# Patient Record
Sex: FEMALE | Race: WHITE | Employment: UNEMPLOYED | URBAN - METROPOLITAN AREA
[De-identification: names, ages, dates, MRNs, and addresses within clinical notes are randomized per-mention and may not be internally consistent; named-entity substitution may affect disease eponyms.]

---

## 2017-02-14 ENCOUNTER — APPOINTMENT (OUTPATIENT)
Dept: LAB | Facility: HOSPITAL | Age: 57
End: 2017-02-14
Attending: INTERNAL MEDICINE
Payer: COMMERCIAL

## 2017-02-14 ENCOUNTER — OFFICE VISIT (OUTPATIENT)
Dept: INTERNAL MEDICINE CLINIC | Facility: CLINIC | Age: 57
End: 2017-02-14

## 2017-02-14 VITALS
BODY MASS INDEX: 23.59 KG/M2 | HEART RATE: 62 BPM | WEIGHT: 139.88 LBS | DIASTOLIC BLOOD PRESSURE: 79 MMHG | SYSTOLIC BLOOD PRESSURE: 119 MMHG | HEIGHT: 64.5 IN

## 2017-02-14 DIAGNOSIS — Z00.00 ANNUAL PHYSICAL EXAM: ICD-10-CM

## 2017-02-14 DIAGNOSIS — Z00.00 ANNUAL PHYSICAL EXAM: Primary | ICD-10-CM

## 2017-02-14 LAB
ALBUMIN SERPL BCP-MCNC: 4 G/DL (ref 3.5–4.8)
ALBUMIN/GLOB SERPL: 1.4 {RATIO} (ref 1–2)
ALP SERPL-CCNC: 98 U/L (ref 32–100)
ALT SERPL-CCNC: 27 U/L (ref 14–54)
ANION GAP SERPL CALC-SCNC: 8 MMOL/L (ref 0–18)
AST SERPL-CCNC: 31 U/L (ref 15–41)
BILIRUB SERPL-MCNC: 1.4 MG/DL (ref 0.3–1.2)
BUN SERPL-MCNC: 9 MG/DL (ref 8–20)
BUN/CREAT SERPL: 13.2 (ref 10–20)
CALCIUM SERPL-MCNC: 9.2 MG/DL (ref 8.5–10.5)
CHLORIDE SERPL-SCNC: 99 MMOL/L (ref 95–110)
CHOLEST SERPL-MCNC: 143 MG/DL (ref 110–200)
CO2 SERPL-SCNC: 30 MMOL/L (ref 22–32)
CREAT SERPL-MCNC: 0.68 MG/DL (ref 0.5–1.5)
ERYTHROCYTE [DISTWIDTH] IN BLOOD BY AUTOMATED COUNT: 12.7 % (ref 11–15)
GLOBULIN PLAS-MCNC: 2.8 G/DL (ref 2.5–3.7)
GLUCOSE SERPL-MCNC: 104 MG/DL (ref 70–99)
HCT VFR BLD AUTO: 42.6 % (ref 35–48)
HDLC SERPL-MCNC: 55 MG/DL
HGB BLD-MCNC: 14.3 G/DL (ref 12–16)
LDLC SERPL CALC-MCNC: 80 MG/DL (ref 0–99)
MCH RBC QN AUTO: 31.1 PG (ref 27–32)
MCHC RBC AUTO-ENTMCNC: 33.5 G/DL (ref 32–37)
MCV RBC AUTO: 92.6 FL (ref 80–100)
NONHDLC SERPL-MCNC: 88 MG/DL
OSMOLALITY UR CALC.SUM OF ELEC: 283 MOSM/KG (ref 275–295)
PLATELET # BLD AUTO: 181 K/UL (ref 140–400)
PMV BLD AUTO: 9.7 FL (ref 7.4–10.3)
POTASSIUM SERPL-SCNC: 4.4 MMOL/L (ref 3.3–5.1)
PROT SERPL-MCNC: 6.8 G/DL (ref 5.9–8.4)
RBC # BLD AUTO: 4.6 M/UL (ref 3.7–5.4)
SODIUM SERPL-SCNC: 137 MMOL/L (ref 136–144)
T3FREE SERPL-MCNC: 3.35 PG/ML (ref 2.53–4.29)
T4 FREE SERPL-MCNC: 0.8 NG/DL (ref 0.58–1.64)
TRIGL SERPL-MCNC: 39 MG/DL (ref 1–149)
TSH SERPL-ACNC: 1.61 UIU/ML (ref 0.34–5.6)
WBC # BLD AUTO: 5.8 K/UL (ref 4–11)

## 2017-02-14 PROCEDURE — 99396 PREV VISIT EST AGE 40-64: CPT | Performed by: INTERNAL MEDICINE

## 2017-02-14 PROCEDURE — 85027 COMPLETE CBC AUTOMATED: CPT

## 2017-02-14 PROCEDURE — 80061 LIPID PANEL: CPT

## 2017-02-14 PROCEDURE — 84443 ASSAY THYROID STIM HORMONE: CPT

## 2017-02-14 PROCEDURE — 84481 FREE ASSAY (FT-3): CPT

## 2017-02-14 PROCEDURE — 80053 COMPREHEN METABOLIC PANEL: CPT

## 2017-02-14 PROCEDURE — 84439 ASSAY OF FREE THYROXINE: CPT

## 2017-02-14 PROCEDURE — 36415 COLL VENOUS BLD VENIPUNCTURE: CPT

## 2017-02-14 NOTE — H&P
Divina Moreno is a 60year old female who presents for a complete physical exam.  HPI:   Her last physical was a few years ago. In general, she feels well. No specific issues for discussion.   She is interested in routine labs to include a full thyroid HPV   • HEADACHES      migraines   • OTHER DISEASES 7/03     ASCUS Pap   • Inguinal hernia 1969   • Thyroid disease 2007     medication   • Herpes infection      per NG: in one eye   • Lazy eye 2013     per NG: \"patient says she has always had a lazy left GENERAL: Pleasant female appearing well in no distress  /79 mmHg  Pulse 62  Ht 5' 4.5\" (1.638 m)  Wt 139 lb 14.4 oz (63.458 kg)  BMI 23.65 kg/m2  HEENT: Anicteric, conjunctiva pink, oropharynx normal  NECK: Supple without mass or thyromegaly or no

## 2017-02-14 NOTE — PATIENT INSTRUCTIONS
Await results of blood tests. Continue healthy diet, regular exercise and maintenance of current body weight. Continue follow-up with Dr. Nasim Emanuel and with Gynecology.

## 2017-03-07 ENCOUNTER — OFFICE VISIT (OUTPATIENT)
Dept: INTERNAL MEDICINE CLINIC | Facility: CLINIC | Age: 57
End: 2017-03-07

## 2017-03-07 VITALS
TEMPERATURE: 99 F | SYSTOLIC BLOOD PRESSURE: 108 MMHG | WEIGHT: 140 LBS | HEIGHT: 64.5 IN | BODY MASS INDEX: 23.61 KG/M2 | OXYGEN SATURATION: 97 % | DIASTOLIC BLOOD PRESSURE: 73 MMHG | HEART RATE: 72 BPM

## 2017-03-07 DIAGNOSIS — R10.13 EPIGASTRIC PAIN: Primary | ICD-10-CM

## 2017-03-07 PROBLEM — R10.9 ABDOMINAL PAIN: Status: ACTIVE | Noted: 2017-03-07

## 2017-03-07 LAB
ALBUMIN SERPL BCP-MCNC: 3.6 G/DL (ref 3.5–4.8)
ALBUMIN/GLOB SERPL: 1.4 {RATIO} (ref 1–2)
ALP SERPL-CCNC: 92 U/L (ref 32–100)
ALT SERPL-CCNC: 29 U/L (ref 14–54)
ANION GAP SERPL CALC-SCNC: 8 MMOL/L (ref 0–18)
AST SERPL-CCNC: 32 U/L (ref 15–41)
BILIRUB SERPL-MCNC: 1.3 MG/DL (ref 0.3–1.2)
BUN SERPL-MCNC: 10 MG/DL (ref 8–20)
BUN/CREAT SERPL: 15.4 (ref 10–20)
CALCIUM SERPL-MCNC: 8.8 MG/DL (ref 8.5–10.5)
CHLORIDE SERPL-SCNC: 103 MMOL/L (ref 95–110)
CO2 SERPL-SCNC: 26 MMOL/L (ref 22–32)
CREAT SERPL-MCNC: 0.65 MG/DL (ref 0.5–1.5)
GLOBULIN PLAS-MCNC: 2.5 G/DL (ref 2.5–3.7)
GLUCOSE SERPL-MCNC: 93 MG/DL (ref 70–99)
LIPASE SERPL-CCNC: 24 U/L (ref 22–51)
OSMOLALITY UR CALC.SUM OF ELEC: 283 MOSM/KG (ref 275–295)
POTASSIUM SERPL-SCNC: 3.9 MMOL/L (ref 3.3–5.1)
PROT SERPL-MCNC: 6.1 G/DL (ref 5.9–8.4)
SODIUM SERPL-SCNC: 137 MMOL/L (ref 136–144)

## 2017-03-07 PROCEDURE — 99212 OFFICE O/P EST SF 10 MIN: CPT | Performed by: INTERNAL MEDICINE

## 2017-03-07 PROCEDURE — 99213 OFFICE O/P EST LOW 20 MIN: CPT | Performed by: INTERNAL MEDICINE

## 2017-03-07 PROCEDURE — 36415 COLL VENOUS BLD VENIPUNCTURE: CPT | Performed by: INTERNAL MEDICINE

## 2017-03-07 RX ORDER — PANTOPRAZOLE SODIUM 40 MG/1
40 TABLET, DELAYED RELEASE ORAL
Qty: 30 TABLET | Refills: 0 | Status: SHIPPED | OUTPATIENT
Start: 2017-03-07 | End: 2017-07-26

## 2017-03-07 NOTE — PATIENT INSTRUCTIONS
Abdominal Pain    Abdominal pain is pain in the stomach or belly area. Everyone has this pain from time to time. In many cases it goes away on its own. But abdominal pain can sometimes be due to a serious problem, such as appendicitis.  So it’s important If you have vomiting or diarrhea, sip water or other clear fluids. When you are ready to eat solid foods again, start with small amounts of easy-to-digest, low-fat foods. These include apple sauce, toast, or crackers.    When to seek medical care  Call [phone]

## 2017-03-07 NOTE — PROGRESS NOTES
HPI:    Patient ID: Jasmyn Sullivan is a 60year old female. HPI she came in today complaining of abdominal pain that started yesterday.   According to her yesterday in the morning she ate at salad bar , afternoon she started having abdominal discomfort mo hallucinations, behavioral problems, confusion, sleep disturbance and agitation. The patient is not nervous/anxious.             Current Outpatient Prescriptions:  Pantoprazole Sodium 40 MG Oral Tab EC Take 1 tablet (40 mg total) by mouth every morning befo Family History   Problem Relation Age of Onset   • Mental Disorder Mother      depression   • Thyroid disease Mother    • Macular degeneration Mother    • Cancer Maternal Grandmother      breast   • Breast Cancer Maternal Grandmother      age -post menop heard.  Pulmonary/Chest: Effort normal and breath sounds normal. No accessory muscle usage. No respiratory distress. She has no wheezes. She has no rales. She exhibits no tenderness. Abdominal: Soft.  Bowel sounds are normal. She exhibits no shifting dull

## 2017-03-09 ENCOUNTER — TELEPHONE (OUTPATIENT)
Dept: INTERNAL MEDICINE CLINIC | Facility: CLINIC | Age: 57
End: 2017-03-09

## 2017-03-09 NOTE — TELEPHONE ENCOUNTER
Pt informed of lab results and informed per chart note to eat light and drink plenty of fluids, pt not sure if she will take the pantoprazole.  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

## 2017-06-30 ENCOUNTER — APPOINTMENT (OUTPATIENT)
Dept: CT IMAGING | Facility: HOSPITAL | Age: 57
End: 2017-06-30
Attending: EMERGENCY MEDICINE
Payer: COMMERCIAL

## 2017-06-30 ENCOUNTER — APPOINTMENT (OUTPATIENT)
Dept: GENERAL RADIOLOGY | Facility: HOSPITAL | Age: 57
End: 2017-06-30
Attending: EMERGENCY MEDICINE
Payer: COMMERCIAL

## 2017-06-30 ENCOUNTER — HOSPITAL ENCOUNTER (EMERGENCY)
Facility: HOSPITAL | Age: 57
Discharge: HOME | End: 2017-06-30
Attending: EMERGENCY MEDICINE
Payer: COMMERCIAL

## 2017-06-30 VITALS
SYSTOLIC BLOOD PRESSURE: 127 MMHG | BODY MASS INDEX: 23.05 KG/M2 | WEIGHT: 135 LBS | OXYGEN SATURATION: 100 % | TEMPERATURE: 97 F | HEIGHT: 64 IN | DIASTOLIC BLOOD PRESSURE: 74 MMHG | HEART RATE: 55 BPM | RESPIRATION RATE: 18 BRPM

## 2017-06-30 DIAGNOSIS — S09.90XA HEAD INJURY, INITIAL ENCOUNTER: Primary | ICD-10-CM

## 2017-06-30 DIAGNOSIS — S63.502A LEFT WRIST SPRAIN, INITIAL ENCOUNTER: ICD-10-CM

## 2017-06-30 PROCEDURE — 73110 X-RAY EXAM OF WRIST: CPT | Performed by: EMERGENCY MEDICINE

## 2017-06-30 PROCEDURE — 99284 EMERGENCY DEPT VISIT MOD MDM: CPT

## 2017-06-30 PROCEDURE — 70450 CT HEAD/BRAIN W/O DYE: CPT | Performed by: EMERGENCY MEDICINE

## 2017-06-30 NOTE — ED INITIAL ASSESSMENT (HPI)
Slip and fall at the 7 eleven. Pt c/o headache and left shoulder, elbow and wrist pain. Pt unknown if there was LOC.

## 2017-06-30 NOTE — ED PROVIDER NOTES
Patient Seen in: [facility] AND [facility] Emergency Department    History   Patient presents with:  Fall    Stated Complaint: fall    HPI    58-year-old female presents for fall and head injury.   Patient was at 711 getting chips and states that after picking up Levothyroxine Sodium (SYNTHROID) 50 MCG Oral Tab,  Take 50 mcg by mouth before breakfast. Indications: MON/WED/FRI   B Complex Oral Cap,  Take 1 capsule by mouth daily. Thyroid (PETRA THYROID) 15 MG Oral Tab,  Take 15 mg by mouth daily.        Family His atraumatic. Right Ear: External ear normal.   Left Ear: External ear normal.   Nose: Nose normal. No sinus tenderness or nasal deformity.    Mouth/Throat: Uvula is midline, oropharynx is clear and moist and mucous membranes are normal.   Eyes: Conjunctiva 4. GCS verbal subscore is 5. GCS motor subscore is 6. Skin: Skin is warm, dry and intact. Psychiatric: She has a normal mood and affect. Her speech is normal.   Nursing note and vitals reviewed.             ED Course   Labs Reviewed - No data to display CEREBRUM: A small parenchymal calcification is evident in the left parietal high convexity (series 2, images 28-29). This is centered at the gray-white matter junction. No acute intraparenchymal hemorrhage, edema, or cortical sulcal effacement is apparent. encounter    Disposition:  Discharge    Follow-up:  Trent Aldrich MD  [address] [place]  [phone]    Schedule an appointment as soon as possible for a visit in 2 days  As needed      Medications Prescribed:  Current Discharge M

## 2017-07-26 ENCOUNTER — OFFICE VISIT (OUTPATIENT)
Dept: INTERNAL MEDICINE CLINIC | Facility: CLINIC | Age: 57
End: 2017-07-26

## 2017-07-26 VITALS
SYSTOLIC BLOOD PRESSURE: 126 MMHG | DIASTOLIC BLOOD PRESSURE: 77 MMHG | WEIGHT: 146 LBS | HEART RATE: 56 BPM | BODY MASS INDEX: 24.62 KG/M2 | HEIGHT: 64.5 IN

## 2017-07-26 DIAGNOSIS — S16.1XXA NECK MUSCLE STRAIN, INITIAL ENCOUNTER: ICD-10-CM

## 2017-07-26 DIAGNOSIS — M25.532 WRIST PAIN, LEFT: Primary | ICD-10-CM

## 2017-07-26 DIAGNOSIS — M25.512 ACUTE PAIN OF LEFT SHOULDER: ICD-10-CM

## 2017-07-26 PROCEDURE — 99212 OFFICE O/P EST SF 10 MIN: CPT | Performed by: INTERNAL MEDICINE

## 2017-07-26 PROCEDURE — 99213 OFFICE O/P EST LOW 20 MIN: CPT | Performed by: INTERNAL MEDICINE

## 2017-07-26 RX ORDER — CYCLOBENZAPRINE HCL 10 MG
10 TABLET ORAL 3 TIMES DAILY
Qty: 30 TABLET | Refills: 1 | Status: SHIPPED | OUTPATIENT
Start: 2017-07-26 | End: 2017-08-15

## 2017-07-26 RX ORDER — MAGNESIUM CITRATE
POWDER (GRAM) MISCELLANEOUS
COMMUNITY

## 2017-07-26 RX ORDER — GARLIC EXTRACT 500 MG
1 CAPSULE ORAL DAILY
COMMUNITY
End: 2019-06-24 | Stop reason: ALTCHOICE

## 2017-07-26 NOTE — PROGRESS NOTES
Jasmyn Sullivan is a 60year old female.   Patient presents with:  Neck Pain: fell on 6/30  Shoulder Pain  Wrist Pain      HPI:   Pt is a 59 yo woman comes with  C/o neck shoulder and wrist pain since June 30th   C/c neck pain, wrist pain, shoulder pain–on th apply route. Disp:  Rfl:    Acidophilus/Pectin Oral Cap Take 1 capsule by mouth daily. Disp:  Rfl:    Cyclobenzaprine HCl 10 MG Oral Tab Take 1 tablet (10 mg total) by mouth 3 (three) times daily.  Disp: 30 tablet Rfl: 1      Past Medical History:   Diagnos abdominal pain and denies heartburn, nausea or vomiting  : No Pain on urination, change in the color of urine, discharge, urinating frequently  MUS: No back pain, joint pain, muscle pain  NEURO: denies headaches , anxiety, depression    EXAM:   /77 shoulder area as well    The patient indicates understanding of these issues and agrees to the plan. Return in about 4 weeks (around 8/23/2017).

## 2017-07-26 NOTE — PATIENT INSTRUCTIONS
Understanding Cervical Strain    There are 7 bones (vertebrae) in the neck that are part of the spine. These are called the cervical spine. Cervical strain is a medical term for neck pain. The neck has several layers of muscles.  These are connected with · Numbness, tingling, weakness or shooting pains into the arms or legs  · New symptoms  Date Last Reviewed: 3/10/2016  © 2165-1562 [address], [address] [place] [address]. All rights reserved.  This information is not intended a

## 2018-01-16 ENCOUNTER — OFFICE VISIT (OUTPATIENT)
Dept: INTERNAL MEDICINE CLINIC | Facility: CLINIC | Age: 58
End: 2018-01-16

## 2018-01-16 VITALS
DIASTOLIC BLOOD PRESSURE: 69 MMHG | WEIGHT: 142 LBS | SYSTOLIC BLOOD PRESSURE: 102 MMHG | HEIGHT: 64.5 IN | BODY MASS INDEX: 23.95 KG/M2 | HEART RATE: 60 BPM

## 2018-01-16 DIAGNOSIS — M54.2 NECK PAIN: ICD-10-CM

## 2018-01-16 DIAGNOSIS — G89.29 CHRONIC LEFT SHOULDER PAIN: Primary | ICD-10-CM

## 2018-01-16 DIAGNOSIS — M25.512 CHRONIC LEFT SHOULDER PAIN: Primary | ICD-10-CM

## 2018-01-16 PROCEDURE — 99213 OFFICE O/P EST LOW 20 MIN: CPT | Performed by: INTERNAL MEDICINE

## 2018-01-16 PROCEDURE — 99212 OFFICE O/P EST SF 10 MIN: CPT | Performed by: INTERNAL MEDICINE

## 2018-01-16 NOTE — PROGRESS NOTES
Sandy Trujillo is a 59year old female.   Patient presents with:  Shoulder Pain: seen in July       HPI:   Pt is a 60 yo woman comes with c/o shoulder pain   C/c shoulder pain   C/o shoulder pain    Had it since last time in July but didn't get the xray , ta calamine lotion Benadryl lotion and hydrocortisone ointment  Is getting a little bit better  Wondering if she can try anything else-she can try aloe vera  Did that she is very sensitive to many things including lotions and creams and medicines  She does ag fatigue  SKIN: denies any unusual skin lesions or rashes  MUS: No back pain,+ joint pain- only left shoulder ,no  muscle pain  NEURO: denies headaches , anxiety, depression    EXAM:   /69 (BP Location: Left arm, Patient Position: Sitting, Cuff Size:

## 2018-01-16 NOTE — PATIENT INSTRUCTIONS
Shoulder and Upper Back Stretch  To start, stand tall with your ears, shoulders, and hips in line. Your feet should be slightly apart, positioned just under your hips. Focus your eyes directly in front of you.   this position for a few seconds bef

## 2018-01-18 ENCOUNTER — TELEPHONE (OUTPATIENT)
Dept: INTERNAL MEDICINE CLINIC | Facility: CLINIC | Age: 58
End: 2018-01-18

## 2018-01-18 NOTE — TELEPHONE ENCOUNTER
WOULD LIKE TO CLARIFY SOMETHING ON HER HX WITH THE NURSE OR DOCTOR.   ASKING FOR CALL BACK SEEN THIS Tuesday

## 2018-01-18 NOTE — TELEPHONE ENCOUNTER
Pt returned call. States when doctor reviewed her medical history doctor asked if she had ever had a neck Xray. She was given an order for a neck Xray. Pt states she forgot that she had an Xray done by her chiropractor in July 2017.  Results were not forwar

## 2018-01-23 ENCOUNTER — TELEPHONE (OUTPATIENT)
Dept: FAMILY MEDICINE CLINIC | Facility: CLINIC | Age: 58
End: 2018-01-23

## 2018-01-25 ENCOUNTER — HOSPITAL ENCOUNTER (OUTPATIENT)
Dept: GENERAL RADIOLOGY | Facility: HOSPITAL | Age: 58
Discharge: HOME | End: 2018-01-25
Attending: INTERNAL MEDICINE
Payer: COMMERCIAL

## 2018-01-25 DIAGNOSIS — G89.29 CHRONIC LEFT SHOULDER PAIN: ICD-10-CM

## 2018-01-25 DIAGNOSIS — M25.512 CHRONIC LEFT SHOULDER PAIN: ICD-10-CM

## 2018-01-25 PROCEDURE — 73030 X-RAY EXAM OF SHOULDER: CPT | Performed by: INTERNAL MEDICINE

## 2018-02-27 ENCOUNTER — OFFICE VISIT (OUTPATIENT)
Dept: PHYSICAL THERAPY | Facility: HOSPITAL | Age: 58
End: 2018-02-27
Attending: INTERNAL MEDICINE
Payer: COMMERCIAL

## 2018-02-27 DIAGNOSIS — G89.29 CHRONIC LEFT SHOULDER PAIN: ICD-10-CM

## 2018-02-27 DIAGNOSIS — M25.512 CHRONIC LEFT SHOULDER PAIN: ICD-10-CM

## 2018-02-27 PROCEDURE — 97161 PT EVAL LOW COMPLEX 20 MIN: CPT

## 2018-02-27 NOTE — PROGRESS NOTES
UPPER EXTREMITY EVALUATION:   Referring Physician: Dr. Kaley Alvarado  Date of Onset: June 2017 Date of Service: 2/27/2018   Diagnosis: L shoulder pain    PATIENT SUMMARY:   Carlos President is a 59year old y/o female who presents to therapy today with complaint Extension, Rotation (B) all WNL and pain-free.  SB R and L minimally restricted but pain-free  Repeated retraction: No change in sx's  Palpation: TTP anterior L shoulder within bicipital groove, L proximal triceps, L subscapularis   Sensation: denies numbne Potential: good    Patient was advised of these findings, precautions, and treatment options and has agreed to actively participate in planning and for this course of care. Thank you for your referral. Please co-sign and return this letter.   Please cont

## 2018-03-01 ENCOUNTER — OFFICE VISIT (OUTPATIENT)
Dept: PHYSICAL THERAPY | Facility: HOSPITAL | Age: 58
End: 2018-03-01
Attending: INTERNAL MEDICINE
Payer: COMMERCIAL

## 2018-03-01 DIAGNOSIS — M25.512 CHRONIC LEFT SHOULDER PAIN: ICD-10-CM

## 2018-03-01 DIAGNOSIS — G89.29 CHRONIC LEFT SHOULDER PAIN: ICD-10-CM

## 2018-03-01 PROCEDURE — 97110 THERAPEUTIC EXERCISES: CPT

## 2018-03-01 NOTE — PROGRESS NOTES
Diagnosis:L shoulder pain  Authorized # of Visits:  2/         Next MD visit: none scheduled  Fall Risk: standard         Precautions: n/a           Medication Changes since last visit?: No  Subjective: Pt states that she feels more weakness and wants to b

## 2018-03-06 ENCOUNTER — OFFICE VISIT (OUTPATIENT)
Dept: PHYSICAL THERAPY | Facility: HOSPITAL | Age: 58
End: 2018-03-06
Attending: INTERNAL MEDICINE
Payer: COMMERCIAL

## 2018-03-06 DIAGNOSIS — M25.512 CHRONIC LEFT SHOULDER PAIN: ICD-10-CM

## 2018-03-06 DIAGNOSIS — G89.29 CHRONIC LEFT SHOULDER PAIN: ICD-10-CM

## 2018-03-06 PROCEDURE — 97110 THERAPEUTIC EXERCISES: CPT

## 2018-03-06 NOTE — PROGRESS NOTES
Diagnosis:L shoulder pain  Authorized # of Visits:  3/8         Next MD visit: none scheduled  Fall Risk: standard         Precautions: n/a           Medication Changes since last visit?: No  Subjective: Pt states that she was sore after therapy but was ab

## 2018-03-08 ENCOUNTER — OFFICE VISIT (OUTPATIENT)
Dept: PHYSICAL THERAPY | Facility: HOSPITAL | Age: 58
End: 2018-03-08
Attending: INTERNAL MEDICINE
Payer: COMMERCIAL

## 2018-03-08 DIAGNOSIS — G89.29 CHRONIC LEFT SHOULDER PAIN: ICD-10-CM

## 2018-03-08 DIAGNOSIS — M25.512 CHRONIC LEFT SHOULDER PAIN: ICD-10-CM

## 2018-03-08 PROCEDURE — 97110 THERAPEUTIC EXERCISES: CPT

## 2018-03-08 NOTE — PROGRESS NOTES
Diagnosis:L shoulder pain  Authorized # of Visits:  4/8         Next MD visit: none scheduled  Fall Risk: standard         Precautions: n/a           Medication Changes since last visit?: No  Subjective: No current complaints of pain.      Objective:      T

## 2018-03-13 ENCOUNTER — OFFICE VISIT (OUTPATIENT)
Dept: PHYSICAL THERAPY | Facility: HOSPITAL | Age: 58
End: 2018-03-13
Attending: INTERNAL MEDICINE
Payer: COMMERCIAL

## 2018-03-13 DIAGNOSIS — G89.29 CHRONIC LEFT SHOULDER PAIN: ICD-10-CM

## 2018-03-13 DIAGNOSIS — M25.512 CHRONIC LEFT SHOULDER PAIN: ICD-10-CM

## 2018-03-13 PROCEDURE — 97110 THERAPEUTIC EXERCISES: CPT

## 2018-03-13 NOTE — PROGRESS NOTES
Diagnosis:L shoulder pain  Authorized # of Visits:  5/8         Next MD visit: none scheduled  Fall Risk: standard         Precautions: n/a           Medication Changes since last visit?: No  Subjective: pt states that she has a rolled towel to use as a johanna

## 2018-03-15 ENCOUNTER — OFFICE VISIT (OUTPATIENT)
Dept: PHYSICAL THERAPY | Facility: HOSPITAL | Age: 58
End: 2018-03-15
Attending: INTERNAL MEDICINE
Payer: COMMERCIAL

## 2018-03-15 DIAGNOSIS — G89.29 CHRONIC LEFT SHOULDER PAIN: ICD-10-CM

## 2018-03-15 DIAGNOSIS — M25.512 CHRONIC LEFT SHOULDER PAIN: ICD-10-CM

## 2018-03-15 PROCEDURE — 97110 THERAPEUTIC EXERCISES: CPT

## 2018-03-15 NOTE — PROGRESS NOTES
Diagnosis:L shoulder pain  Authorized # of Visits:  6/8         Next MD visit: none scheduled  Fall Risk: standard         Precautions: n/a           Medication Changes since last visit?: No  Subjective: pt states that her shoulder is fine but has some nec

## 2018-03-20 ENCOUNTER — APPOINTMENT (OUTPATIENT)
Dept: PHYSICAL THERAPY | Facility: HOSPITAL | Age: 58
End: 2018-03-20
Attending: INTERNAL MEDICINE
Payer: COMMERCIAL

## 2018-03-22 ENCOUNTER — OFFICE VISIT (OUTPATIENT)
Dept: PHYSICAL THERAPY | Facility: HOSPITAL | Age: 58
End: 2018-03-22
Attending: INTERNAL MEDICINE
Payer: COMMERCIAL

## 2018-03-22 DIAGNOSIS — M25.512 CHRONIC LEFT SHOULDER PAIN: ICD-10-CM

## 2018-03-22 DIAGNOSIS — G89.29 CHRONIC LEFT SHOULDER PAIN: ICD-10-CM

## 2018-03-22 PROCEDURE — 97110 THERAPEUTIC EXERCISES: CPT

## 2018-03-22 PROCEDURE — 97530 THERAPEUTIC ACTIVITIES: CPT

## 2018-03-22 NOTE — PROGRESS NOTES
Patient Name: Shelley Ontiveros, : 1960, MRN: [id number]   Date:  3/22/2018  Referring Physician:  Nohemi Bunn    Diagnosis: L shoulder pain    Discharge Summary    Pt has attended 7, cancelled 1, and no shown 0 visits in Physical Therapy.      Prog findings, precautions, and treatment options and has agreed to actively participate in planning and for this course of care. Thank you for your referral. If you have any questions, please contact me at Dept: [phone].     Sincerely,  Lalo Whitten

## 2018-03-26 ENCOUNTER — OFFICE VISIT (OUTPATIENT)
Dept: INTERNAL MEDICINE CLINIC | Facility: CLINIC | Age: 58
End: 2018-03-26

## 2018-03-26 VITALS
DIASTOLIC BLOOD PRESSURE: 72 MMHG | BODY MASS INDEX: 23.61 KG/M2 | HEART RATE: 59 BPM | SYSTOLIC BLOOD PRESSURE: 110 MMHG | HEIGHT: 64.5 IN | WEIGHT: 140 LBS

## 2018-03-26 DIAGNOSIS — K13.0 DRY LIPS: ICD-10-CM

## 2018-03-26 DIAGNOSIS — K52.9 GASTROENTERITIS: ICD-10-CM

## 2018-03-26 DIAGNOSIS — G89.29 CHRONIC LEFT SHOULDER PAIN: Primary | ICD-10-CM

## 2018-03-26 DIAGNOSIS — M25.512 CHRONIC LEFT SHOULDER PAIN: Primary | ICD-10-CM

## 2018-03-26 PROCEDURE — 99212 OFFICE O/P EST SF 10 MIN: CPT | Performed by: INTERNAL MEDICINE

## 2018-03-26 PROCEDURE — 99213 OFFICE O/P EST LOW 20 MIN: CPT | Performed by: INTERNAL MEDICINE

## 2018-03-26 NOTE — PROGRESS NOTES
Alejandra Peoples is a 59year old female. Patient presents with:   Follow - Up: shoulder pain finished PT       HPI:   Pt comes as a f/u   c/o shoulder pain   c/o left shoulder pain - finished physical therapy --   flossing the nerve and that's helping - cont hydrocortisone ointment  Is getting a little bit better  Wondering if she can try anything else-she can try aloe vera  Did that she is very sensitive to many things including lotions and creams and medicines  She does agree that it is getting better      C lesions or rashes  RESPIRATORY: denies shortness of breath, cough, wheezing  GI: denies abdominal pain and denies heartburn, nausea or vomiting, bloating has now resolved   : No Pain on urination, change in the color of urine, discharge, urinating freque

## 2019-03-19 ENCOUNTER — APPOINTMENT (OUTPATIENT)
Dept: CT IMAGING | Facility: HOSPITAL | Age: 59
End: 2019-03-19
Attending: EMERGENCY MEDICINE
Payer: COMMERCIAL

## 2019-03-19 ENCOUNTER — HOSPITAL ENCOUNTER (EMERGENCY)
Facility: HOSPITAL | Age: 59
Discharge: HOME | End: 2019-03-19
Attending: EMERGENCY MEDICINE
Payer: COMMERCIAL

## 2019-03-19 ENCOUNTER — HOSPITAL ENCOUNTER (OUTPATIENT)
Age: 59
Discharge: ANOTHER HOSPITAL | End: 2019-03-19
Attending: EMERGENCY MEDICINE
Payer: COMMERCIAL

## 2019-03-19 VITALS
SYSTOLIC BLOOD PRESSURE: 129 MMHG | RESPIRATION RATE: 18 BRPM | WEIGHT: 150 LBS | TEMPERATURE: 98 F | HEIGHT: 64 IN | BODY MASS INDEX: 25.61 KG/M2 | DIASTOLIC BLOOD PRESSURE: 78 MMHG | OXYGEN SATURATION: 99 % | HEART RATE: 56 BPM

## 2019-03-19 VITALS
TEMPERATURE: 97 F | DIASTOLIC BLOOD PRESSURE: 68 MMHG | SYSTOLIC BLOOD PRESSURE: 158 MMHG | HEART RATE: 50 BPM | RESPIRATION RATE: 18 BRPM | BODY MASS INDEX: 25.61 KG/M2 | HEIGHT: 64 IN | WEIGHT: 150 LBS | OXYGEN SATURATION: 100 %

## 2019-03-19 DIAGNOSIS — M54.50 ACUTE LEFT-SIDED LOW BACK PAIN WITHOUT SCIATICA: Primary | ICD-10-CM

## 2019-03-19 DIAGNOSIS — R31.9 HEMATURIA, UNSPECIFIED TYPE: ICD-10-CM

## 2019-03-19 LAB
ANION GAP SERPL CALC-SCNC: 5 MMOL/L (ref 0–18)
BACTERIA UR QL AUTO: NEGATIVE /HPF
BASOPHILS # BLD AUTO: 0.07 X10(3) UL (ref 0–0.2)
BASOPHILS NFR BLD AUTO: 0.9 %
BILIRUB UR QL STRIP: NEGATIVE
BILIRUB UR QL: NEGATIVE
BUN BLD-MCNC: 8 MG/DL (ref 7–18)
BUN/CREAT SERPL: 13.3 (ref 10–20)
CALCIUM BLD-MCNC: 9.2 MG/DL (ref 8.5–10.1)
CHLORIDE SERPL-SCNC: 101 MMOL/L (ref 98–107)
CLARITY UR: CLEAR
CLARITY UR: CLEAR
CO2 SERPL-SCNC: 29 MMOL/L (ref 21–32)
COLOR UR: YELLOW
COLOR UR: YELLOW
CREAT BLD-MCNC: 0.6 MG/DL (ref 0.55–1.02)
DEPRECATED RDW RBC AUTO: 42 FL (ref 35.1–46.3)
EOSINOPHIL # BLD AUTO: 0.09 X10(3) UL (ref 0–0.7)
EOSINOPHIL NFR BLD AUTO: 1.1 %
ERYTHROCYTE [DISTWIDTH] IN BLOOD BY AUTOMATED COUNT: 12.2 % (ref 11–15)
GLUCOSE BLD-MCNC: 95 MG/DL (ref 70–99)
GLUCOSE UR STRIP-MCNC: NEGATIVE MG/DL
GLUCOSE UR-MCNC: NEGATIVE MG/DL
HCT VFR BLD AUTO: 43.5 % (ref 35–48)
HGB BLD-MCNC: 14.4 G/DL (ref 12–16)
HGB UR QL STRIP.AUTO: NEGATIVE
IMM GRANULOCYTES # BLD AUTO: 0.01 X10(3) UL (ref 0–1)
IMM GRANULOCYTES NFR BLD: 0.1 %
LEUKOCYTE ESTERASE UR QL STRIP.AUTO: NEGATIVE
LEUKOCYTE ESTERASE UR QL STRIP: NEGATIVE
LYMPHOCYTES # BLD AUTO: 2.66 X10(3) UL (ref 1–4)
LYMPHOCYTES NFR BLD AUTO: 32.9 %
MCH RBC QN AUTO: 31.2 PG (ref 26–34)
MCHC RBC AUTO-ENTMCNC: 33.1 G/DL (ref 31–37)
MCV RBC AUTO: 94.2 FL (ref 80–100)
MONOCYTES # BLD AUTO: 0.61 X10(3) UL (ref 0.1–1)
MONOCYTES NFR BLD AUTO: 7.5 %
NEUTROPHILS # BLD AUTO: 4.64 X10 (3) UL (ref 1.5–7.7)
NEUTROPHILS # BLD AUTO: 4.64 X10(3) UL (ref 1.5–7.7)
NEUTROPHILS NFR BLD AUTO: 57.5 %
NITRITE UR QL STRIP.AUTO: NEGATIVE
NITRITE UR QL STRIP: NEGATIVE
OSMOLALITY SERPL CALC.SUM OF ELEC: 278 MOSM/KG (ref 275–295)
PH UR STRIP: 7.5 [PH]
PH UR: 8 [PH] (ref 5–8)
PLATELET # BLD AUTO: 204 10(3)UL (ref 150–450)
POTASSIUM SERPL-SCNC: 3.9 MMOL/L (ref 3.5–5.1)
PROT UR STRIP-MCNC: NEGATIVE MG/DL
PROT UR-MCNC: NEGATIVE MG/DL
RBC # BLD AUTO: 4.62 X10(6)UL (ref 3.8–5.3)
RBC #/AREA URNS AUTO: 3 /HPF
SODIUM SERPL-SCNC: 135 MMOL/L (ref 136–145)
SP GR UR STRIP: 1.01
SP GR UR STRIP: 1.01 (ref 1–1.03)
UROBILINOGEN UR STRIP-ACNC: <2
UROBILINOGEN UR STRIP-ACNC: <2 MG/DL
VIT C UR-MCNC: 20 MG/DL
WBC # BLD AUTO: 8.1 X10(3) UL (ref 4–11)
WBC #/AREA URNS AUTO: 1 /HPF

## 2019-03-19 PROCEDURE — 80048 BASIC METABOLIC PNL TOTAL CA: CPT | Performed by: EMERGENCY MEDICINE

## 2019-03-19 PROCEDURE — 85025 COMPLETE CBC W/AUTO DIFF WBC: CPT | Performed by: EMERGENCY MEDICINE

## 2019-03-19 PROCEDURE — 74176 CT ABD & PELVIS W/O CONTRAST: CPT | Performed by: EMERGENCY MEDICINE

## 2019-03-19 PROCEDURE — 99213 OFFICE O/P EST LOW 20 MIN: CPT

## 2019-03-19 PROCEDURE — 81003 URINALYSIS AUTO W/O SCOPE: CPT | Performed by: EMERGENCY MEDICINE

## 2019-03-19 PROCEDURE — 96374 THER/PROPH/DIAG INJ IV PUSH: CPT

## 2019-03-19 PROCEDURE — 81003 URINALYSIS AUTO W/O SCOPE: CPT

## 2019-03-19 PROCEDURE — 99284 EMERGENCY DEPT VISIT MOD MDM: CPT

## 2019-03-19 RX ORDER — CYCLOBENZAPRINE HCL 10 MG
10 TABLET ORAL 3 TIMES DAILY PRN
Qty: 20 TABLET | Refills: 0 | Status: SHIPPED | OUTPATIENT
Start: 2019-03-19 | End: 2019-03-26

## 2019-03-19 RX ORDER — MORPHINE SULFATE 4 MG/ML
2 INJECTION, SOLUTION INTRAMUSCULAR; INTRAVENOUS ONCE
Status: COMPLETED | OUTPATIENT
Start: 2019-03-19 | End: 2019-03-19

## 2019-03-19 NOTE — ED PROVIDER NOTES
Patient Seen in: [address]    History   Patient presents with:  Back Pain (musculoskeletal)    Stated Complaint: mid back pain    HPI    The patient is a 49-year-old female with no significant past medical history prese • OTHER SURGICAL HISTORY      breast bx   • OTHER SURGICAL HISTORY      inguinal hernia       Family history reviewed and is not pertinent to presenting problem.     Social History    Tobacco Use      Smoking status: Former Smoker        Packs/day: 1.00 (*)     Blood, Urine Small (*)     All other components within normal limits          The patient's urine dip demonstrating small amounts of blood was discussed with the patient.   I believe the etiology of the patient's pain is most likely musculoskeletal.

## 2019-03-20 NOTE — ED NOTES
Pt to ER with c/o left mid to lower back pain for about 3 days. Pt states she has been constipated over the weekend but had normal bowel movements on Sunday and Monday with OTC stool softener. Pt denies diarrhea. Pt denies blood in stool.  Pt c/o occasional

## 2019-03-20 NOTE — ED PROVIDER NOTES
Patient Seen in: [facility] AND [facility] Emergency Department    History   Patient presents with:  Back Pain (musculoskeletal)    Stated Complaint: mid back pain    HPI    27-year-old female with history of thyroid disease presents with complaints of left-side status: Former Smoker        Packs/day: 1.00        Years: 10.00        Pack years: 10        Types: Cigarettes        Quit date: 1988        Years since quittin.6      Smokeless tobacco: Never Used    Alcohol use: No      Alcohol/week: 0.0 oz other components within normal limits   CBC WITH DIFFERENTIAL WITH PLATELET    Narrative: The following orders were created for panel order CBC WITH DIFFERENTIAL WITH PLATELET.   Procedure                               Abnormality         Status Refills: 0

## 2020-07-23 ENCOUNTER — NURSE TRIAGE (OUTPATIENT)
Dept: INTERNAL MEDICINE CLINIC | Facility: CLINIC | Age: 60
End: 2020-07-23

## 2020-07-23 ENCOUNTER — TELEMEDICINE (OUTPATIENT)
Dept: INTERNAL MEDICINE CLINIC | Facility: CLINIC | Age: 60
End: 2020-07-23

## 2020-07-23 DIAGNOSIS — Z20.822 SUSPECTED COVID-19 VIRUS INFECTION: Primary | ICD-10-CM

## 2020-07-23 PROCEDURE — 99213 OFFICE O/P EST LOW 20 MIN: CPT | Performed by: INTERNAL MEDICINE

## 2020-07-23 NOTE — TELEPHONE ENCOUNTER
Action Requested: Summary for Provider     []  Critical Lab, Recommendations Needed  [] Need Additional Advice  []   FYI    []   Need Orders  [] Need Medications Sent to Pharmacy  []  Other     SUMMARY: Patient requesting to be tested for Covid, reports la

## 2020-07-23 NOTE — PROGRESS NOTES
This is a telemedicine visit with live, interactive video and audio. Patient understands and accepts financial responsibility for any deductible, co-insurance and/or co-pays associated with this service.     SUBJECTIVE    She called in today with sore t Maternal Grandmother         breast   • Breast Cancer Maternal Grandmother         age -post menopause   • Cataracts Sister    • Cataracts Other         uncle   • Diabetes Cousin    • Glaucoma Neg       Social History    Tobacco Use      Smoking status:  Fo

## 2020-07-24 ENCOUNTER — LAB ENCOUNTER (OUTPATIENT)
Dept: LAB | Facility: HOSPITAL | Age: 60
End: 2020-07-24
Attending: INTERNAL MEDICINE
Payer: COMMERCIAL

## 2020-07-24 DIAGNOSIS — Z20.822 SUSPECTED COVID-19 VIRUS INFECTION: ICD-10-CM

## 2020-07-26 LAB — SARS-COV-2 RNA RESP QL NAA+PROBE: NOT DETECTED

## 2020-10-19 ENCOUNTER — TELEMEDICINE (OUTPATIENT)
Dept: TELEHEALTH | Age: 60
End: 2020-10-19
Payer: COMMERCIAL

## 2020-10-19 ENCOUNTER — LAB ENCOUNTER (OUTPATIENT)
Dept: LAB | Facility: HOSPITAL | Age: 60
End: 2020-10-19
Attending: NURSE PRACTITIONER
Payer: COMMERCIAL

## 2020-10-19 DIAGNOSIS — Z20.822 SUSPECTED COVID-19 VIRUS INFECTION: Primary | ICD-10-CM

## 2020-10-19 DIAGNOSIS — Z20.822 SUSPECTED COVID-19 VIRUS INFECTION: ICD-10-CM

## 2020-10-19 PROCEDURE — 99203 OFFICE O/P NEW LOW 30 MIN: CPT | Performed by: NURSE PRACTITIONER

## 2020-10-19 NOTE — PATIENT INSTRUCTIONS
Quarantine Advice: Those who have tested positive for COVID should observe a 10-day quarantine period starting the day your symptoms began.  You may return to activity and work if your respiratory symptoms have improved and you are fever free for at leas sneezes  Cover your mouth and nose with a tissue when you cough or sneeze.  Throw used tissues in a lined trash can; immediately wash your hands with soap and water for at least 20 seconds or clean your hands with an alcohol-based hand  that contai before you enter the facility. These steps will help the healthcare provider's office to keep other people in the office or waiting room from getting infected or exposed. Ask your healthcare provider to call the local or state health department.  Persons w home. Also, you should use a separate bathroom, if available. If you need to be around other people or outside of the home, wear a facemask. Avoid sharing personal items with other people in your household, like dishes, towels, and bedding.   Clean all krista

## 2021-02-05 ENCOUNTER — HOSPITAL ENCOUNTER (OUTPATIENT)
Age: 61
Discharge: HOME | End: 2021-02-05
Payer: COMMERCIAL

## 2021-02-05 VITALS
HEART RATE: 64 BPM | RESPIRATION RATE: 16 BRPM | OXYGEN SATURATION: 100 % | TEMPERATURE: 98 F | SYSTOLIC BLOOD PRESSURE: 137 MMHG | DIASTOLIC BLOOD PRESSURE: 78 MMHG

## 2021-02-05 DIAGNOSIS — Z20.822 ENCOUNTER FOR LABORATORY TESTING FOR COVID-19 VIRUS: Primary | ICD-10-CM

## 2021-02-05 DIAGNOSIS — G44.89 OTHER HEADACHE SYNDROME: ICD-10-CM

## 2021-02-05 DIAGNOSIS — R11.2 NAUSEA AND VOMITING IN ADULT: ICD-10-CM

## 2021-02-05 DIAGNOSIS — R82.90 ABNORMAL FINDING IN URINE: ICD-10-CM

## 2021-02-05 LAB
BILIRUB UR QL STRIP: NEGATIVE
CLARITY UR: CLEAR
COLOR UR: YELLOW
GLUCOSE UR STRIP-MCNC: NEGATIVE MG/DL
KETONES UR STRIP-MCNC: NEGATIVE MG/DL
LEUKOCYTE ESTERASE UR QL STRIP: NEGATIVE
NITRITE UR QL STRIP: NEGATIVE
PH UR STRIP: 7.5 [PH]
PROT UR STRIP-MCNC: NEGATIVE MG/DL
SARS-COV-2 RNA RESP QL NAA+PROBE: NOT DETECTED
SP GR UR STRIP: 1.01
UROBILINOGEN UR STRIP-ACNC: <2 MG/DL

## 2021-02-05 PROCEDURE — 81002 URINALYSIS NONAUTO W/O SCOPE: CPT | Performed by: EMERGENCY MEDICINE

## 2021-02-05 PROCEDURE — 99213 OFFICE O/P EST LOW 20 MIN: CPT | Performed by: EMERGENCY MEDICINE

## 2021-02-06 NOTE — ED INITIAL ASSESSMENT (HPI)
PATIENT IS HERE WITH HEAD PAIN AND LIGHT SENSITIVITY ALONG WITH A FEVER. SHE STATES SHE VOMITED YESTERDAY AND WAS IN BED ALL DAY TODAY.

## 2021-02-06 NOTE — ED PROVIDER NOTES
Patient Seen in: [facility]      History   Patient presents with:  Covid-19 Test    Stated Complaint: testing - symptoms    HPI/Subjective:   Nickolas Smith is a healthy 56year old  female here for Covid testing after symptoms of headache, n Use      Smoking status: Former Smoker        Packs/day: 1.00        Years: 10.00        Pack years: 10        Types: Cigarettes        Quit date: 1988        Years since quittin.5      Smokeless tobacco: Never Used    Alcohol use: No      Alcoho cervical adenopathy. Skin:     General: Skin is warm. Capillary Refill: Capillary refill takes less than 2 seconds. Findings: No lesion or rash. Neurological:      General: No focal deficit present.       Mental Status: She is alert and orient (primary encounter diagnosis)  Abnormal finding in urine  Nausea and vomiting in adult  Other headache syndrome    Disposition:  Discharge  2/5/2021  7:42 pm    Follow-up:  Gustavo Bazzi MD  [address]  [phone]

## 2021-02-08 ENCOUNTER — HOSPITAL ENCOUNTER (OUTPATIENT)
Age: 61
Discharge: HOME | End: 2021-02-08
Payer: COMMERCIAL

## 2021-02-08 VITALS
WEIGHT: 170 LBS | DIASTOLIC BLOOD PRESSURE: 72 MMHG | OXYGEN SATURATION: 100 % | TEMPERATURE: 97 F | HEART RATE: 60 BPM | BODY MASS INDEX: 29.02 KG/M2 | SYSTOLIC BLOOD PRESSURE: 129 MMHG | RESPIRATION RATE: 16 BRPM | HEIGHT: 64 IN

## 2021-02-08 DIAGNOSIS — R82.90 NONSPECIFIC FINDINGS ON EXAMINATION OF URINE: Primary | ICD-10-CM

## 2021-02-08 LAB
BILIRUB UR QL STRIP: NEGATIVE
CLARITY UR: CLEAR
COLOR UR: YELLOW
GLUCOSE UR STRIP-MCNC: NEGATIVE MG/DL
KETONES UR STRIP-MCNC: NEGATIVE MG/DL
LEUKOCYTE ESTERASE UR QL STRIP: NEGATIVE
NITRITE UR QL STRIP: NEGATIVE
PH UR STRIP: 6.5 [PH]
PROT UR STRIP-MCNC: NEGATIVE MG/DL
SP GR UR STRIP: 1.01
UROBILINOGEN UR STRIP-ACNC: <2 MG/DL

## 2021-02-08 PROCEDURE — 99213 OFFICE O/P EST LOW 20 MIN: CPT | Performed by: NURSE PRACTITIONER

## 2021-02-08 PROCEDURE — 81002 URINALYSIS NONAUTO W/O SCOPE: CPT | Performed by: NURSE PRACTITIONER

## 2021-02-09 NOTE — ED PROVIDER NOTES
Patient presents with:  Urinary Symptoms      HPI:     Gregg Goodell is a 56year old female who presents for a urine test. She was seen here 3 days ago after a viral syndrome. She had a negative COVID test and states her GI symptoms have resolved.  She sta Years since quittin.5      Smokeless tobacco: Never Used    Substance and Sexual Activity      Alcohol use: No        Alcohol/week: 0.0 standard drinks      Drug use: No      Sexual activity: Yes    Lifestyle      Physical activity        Days per wee rashes  HEENT:normocephalic, ears and throat are clear  EYES: sclera non icteric, conjunctiva non-injected  NECK: supple, no adenopathy  LUNGS: clear to auscultation, no RRW  CARDIO: RRR without murmur  EXTREMITIES: no cyanosis or edema.  CORCORAN without diffic

## 2021-04-07 ENCOUNTER — TELEPHONE (OUTPATIENT)
Dept: INTERNAL MEDICINE CLINIC | Facility: CLINIC | Age: 61
End: 2021-04-07

## 2021-04-07 DIAGNOSIS — Z12.31 BREAST CANCER SCREENING BY MAMMOGRAM: Primary | ICD-10-CM

## 2021-04-07 NOTE — TELEPHONE ENCOUNTER
Please see note below and advise. Order pended. Patient has an appointment with Dr Macy Cole on 6/7/2021.

## 2021-04-08 NOTE — TELEPHONE ENCOUNTER
Left message on voicemail that mammogram ordered and to call [phone] to get it scheduled. MyChart message sent as well.

## 2021-04-10 ENCOUNTER — HOSPITAL ENCOUNTER (OUTPATIENT)
Dept: MAMMOGRAPHY | Facility: HOSPITAL | Age: 61
Discharge: HOME | End: 2021-04-10
Attending: INTERNAL MEDICINE
Payer: COMMERCIAL

## 2021-04-10 DIAGNOSIS — Z12.31 BREAST CANCER SCREENING BY MAMMOGRAM: ICD-10-CM

## 2021-04-10 PROCEDURE — 77063 BREAST TOMOSYNTHESIS BI: CPT | Performed by: INTERNAL MEDICINE

## 2021-04-10 PROCEDURE — 77067 SCR MAMMO BI INCL CAD: CPT | Performed by: INTERNAL MEDICINE

## 2021-04-12 NOTE — TELEPHONE ENCOUNTER
[email or web address] message viewed by patient.      From   Karol Regalado RN To   Arleen Stallings and Delivered   4/8/2021 11:41 AM   Last Read in [employer]   4/8/2021 12:58 PM by Faith Mak

## 2021-04-16 ENCOUNTER — TELEPHONE (OUTPATIENT)
Dept: INTERNAL MEDICINE CLINIC | Facility: CLINIC | Age: 61
End: 2021-04-16

## 2021-04-16 NOTE — TELEPHONE ENCOUNTER
Patient calling today as she had her mammogram completed on 4/10/21 and was advised  The radiologist has requested previous mammogram films so they can compare the current one to the old studies.  This to see whether is anything is new or not.  If they are

## 2021-04-16 NOTE — TELEPHONE ENCOUNTER
Please call the mammogram dept and get a further explanation on if they need the pateint to do anything.  Typically, the Rad dept will vinicius down the prior studies

## 2021-04-16 NOTE — TELEPHONE ENCOUNTER
Per patient she had her 1st Covid 19 vaccine last 04/13/2021 and her 2nd will be on 05/04/2021 in [facility] with Jorge Mistry.

## 2021-04-16 NOTE — TELEPHONE ENCOUNTER
I sent a message to the patient via [address] He can contact her by phone and relayed the message that they have not looked at the old films and will be contacting her to schedule additional mammogram views.

## 2021-04-16 NOTE — TELEPHONE ENCOUNTER
Patient viewed [address]     The radiology department has compared the current studies to the previous studies. Francie Kapoor are recommending additional mammogram imaging. Francie Kapoor will be in contact with you to set all of this up.     Please contact the office if you

## 2021-04-16 NOTE — TELEPHONE ENCOUNTER
Spoke to mammogram department and a new addendum was resulted today. Dr. Eboni Langley please see Mesfin Jean from 4/10/21.     Study Result    Addenda   ADDENDUM:  Grouped calcifications in the anterior right breast approximately 06:00 o'clock are not clearly seen

## 2021-04-20 ENCOUNTER — OFFICE VISIT (OUTPATIENT)
Dept: INTERNAL MEDICINE CLINIC | Facility: CLINIC | Age: 61
End: 2021-04-20
Payer: COMMERCIAL

## 2021-04-20 ENCOUNTER — LAB ENCOUNTER (OUTPATIENT)
Dept: LAB | Facility: HOSPITAL | Age: 61
End: 2021-04-20
Attending: NURSE PRACTITIONER
Payer: COMMERCIAL

## 2021-04-20 VITALS
HEIGHT: 64 IN | WEIGHT: 184.5 LBS | HEART RATE: 56 BPM | SYSTOLIC BLOOD PRESSURE: 116 MMHG | DIASTOLIC BLOOD PRESSURE: 77 MMHG | BODY MASS INDEX: 31.5 KG/M2

## 2021-04-20 DIAGNOSIS — E55.9 VITAMIN D DEFICIENCY: ICD-10-CM

## 2021-04-20 DIAGNOSIS — E53.8 VITAMIN B12 DEFICIENCY: ICD-10-CM

## 2021-04-20 DIAGNOSIS — Z00.00 ANNUAL PHYSICAL EXAM: Primary | ICD-10-CM

## 2021-04-20 DIAGNOSIS — Z00.00 ANNUAL PHYSICAL EXAM: ICD-10-CM

## 2021-04-20 PROCEDURE — 80053 COMPREHEN METABOLIC PANEL: CPT | Performed by: NURSE PRACTITIONER

## 2021-04-20 PROCEDURE — 82607 VITAMIN B-12: CPT | Performed by: NURSE PRACTITIONER

## 2021-04-20 PROCEDURE — 84443 ASSAY THYROID STIM HORMONE: CPT | Performed by: NURSE PRACTITIONER

## 2021-04-20 PROCEDURE — 36415 COLL VENOUS BLD VENIPUNCTURE: CPT | Performed by: NURSE PRACTITIONER

## 2021-04-20 PROCEDURE — 99386 PREV VISIT NEW AGE 40-64: CPT | Performed by: NURSE PRACTITIONER

## 2021-04-20 PROCEDURE — 3008F BODY MASS INDEX DOCD: CPT | Performed by: NURSE PRACTITIONER

## 2021-04-20 PROCEDURE — 82306 VITAMIN D 25 HYDROXY: CPT | Performed by: NURSE PRACTITIONER

## 2021-04-20 PROCEDURE — 85025 COMPLETE CBC W/AUTO DIFF WBC: CPT | Performed by: NURSE PRACTITIONER

## 2021-04-20 PROCEDURE — 3074F SYST BP LT 130 MM HG: CPT | Performed by: NURSE PRACTITIONER

## 2021-04-20 PROCEDURE — 81015 MICROSCOPIC EXAM OF URINE: CPT

## 2021-04-20 PROCEDURE — 80061 LIPID PANEL: CPT | Performed by: NURSE PRACTITIONER

## 2021-04-20 PROCEDURE — 3078F DIAST BP <80 MM HG: CPT | Performed by: NURSE PRACTITIONER

## 2021-04-20 NOTE — ASSESSMENT & PLAN NOTE
56-year-old female who is scheduled to have a physical exam tomorrow but accidentally came in today. We were able to accommodate her. She is a very pleasant person. She has no complaints.   She recently had a mammogram and needs to go back for further im

## 2021-04-20 NOTE — PROGRESS NOTES
HPI:    Patient ID: Roby Quintana is a 56year old female. Patient is not sexually active. Dr. Elena Garrett [facility]). She sees [facility].     Immunization History  Administered            Date(s) Administered    [employer] 30 mcg/0.3 ml vegetables.       Past Medical History:   Diagnosis Date   • Autoimmune disease ([facility] [address]    • BACK PAIN    • Blepharitis 2013    OU   • Cataract 2013    OU   • CERVICAL DYSPLASIA     HPV   • HEADACHES     migraines   • Herpes infection     per NG: in one eye HENT: Negative for congestion, ear discharge, ear pain, facial swelling, hearing loss, postnasal drip, sinus pressure, sore throat and trouble swallowing. Eyes: Positive for visual disturbance (Right eye cataract).  Negative for pain, discharge and red 31.67 kg/m²   Wt Readings from Last 2 Encounters:  04/20/21 : 184 lb 8 oz (83.7 kg)  02/08/21 : 170 lb (77.1 kg)    Body mass index is 31.67 kg/m². (2)           ASSESSMENT/PLAN:     Problem List Items Addressed This Visit        Unprioritized    Annual phy B12      TSH W Reflex To Free T4      UA Microscopic only, urine [E]      Meds This Visit:  Requested Prescriptions      No prescriptions requested or ordered in this encounter       Imaging & Referrals:  None         NADEGE Lindo

## 2021-05-10 ENCOUNTER — HOSPITAL ENCOUNTER (OUTPATIENT)
Dept: MAMMOGRAPHY | Facility: HOSPITAL | Age: 61
Discharge: HOME | End: 2021-05-10
Attending: INTERNAL MEDICINE
Payer: COMMERCIAL

## 2021-05-10 DIAGNOSIS — R92.8 ABNORMAL MAMMOGRAM: ICD-10-CM

## 2021-05-10 PROCEDURE — 77065 DX MAMMO INCL CAD UNI: CPT | Performed by: INTERNAL MEDICINE

## 2021-05-10 PROCEDURE — 77061 BREAST TOMOSYNTHESIS UNI: CPT | Performed by: INTERNAL MEDICINE

## 2021-09-23 ENCOUNTER — TELEPHONE (OUTPATIENT)
Dept: INTERNAL MEDICINE CLINIC | Facility: CLINIC | Age: 61
End: 2021-09-23

## 2021-09-23 NOTE — TELEPHONE ENCOUNTER
Patient is requesting orders for physical an occupational therapy for her legs. Please call when orders are placed or any questions.

## 2021-09-23 NOTE — TELEPHONE ENCOUNTER
Pt has a h/x of plantar fasciitis and has had pain again. She has tried to go to her chiropractor for adjustments and stretching with minimal relief. She states PT has worked before in the past. Advised to call back for worsening of symptoms.  Apt made with

## 2021-09-23 NOTE — TELEPHONE ENCOUNTER
Spoke to patient and asked her why she was requesting physical therapy and occupational therapy. She states she has plantar fasciitis on her right foot since the end of June.  I advised her to schedule an appointment to be seen to address her issue and her

## 2021-09-24 ENCOUNTER — OFFICE VISIT (OUTPATIENT)
Dept: INTERNAL MEDICINE CLINIC | Facility: CLINIC | Age: 61
End: 2021-09-24
Payer: COMMERCIAL

## 2021-09-24 VITALS
BODY MASS INDEX: 31.58 KG/M2 | SYSTOLIC BLOOD PRESSURE: 124 MMHG | HEIGHT: 64 IN | WEIGHT: 185 LBS | RESPIRATION RATE: 16 BRPM | HEART RATE: 59 BPM | DIASTOLIC BLOOD PRESSURE: 81 MMHG

## 2021-09-24 DIAGNOSIS — M72.2 PLANTAR FASCIITIS OF RIGHT FOOT: Primary | ICD-10-CM

## 2021-09-24 PROCEDURE — 99213 OFFICE O/P EST LOW 20 MIN: CPT | Performed by: INTERNAL MEDICINE

## 2021-09-24 PROCEDURE — 3079F DIAST BP 80-89 MM HG: CPT | Performed by: INTERNAL MEDICINE

## 2021-09-24 PROCEDURE — 3008F BODY MASS INDEX DOCD: CPT | Performed by: INTERNAL MEDICINE

## 2021-09-24 PROCEDURE — 3074F SYST BP LT 130 MM HG: CPT | Performed by: INTERNAL MEDICINE

## 2021-09-24 NOTE — PROGRESS NOTES
Morgan Hennessy is a 59year old female. Patient presents with:  Heel Pain: right heel pain. pt stts she has plantar fascitis    HPI:   Since late June she has had persistent \"plantar fasciitis\" affecting her right heel.   She has ongoing pain on the planta that does not track\"   • MGD (meibomian gland dysfunction) 2013    OU; Schirmer's test 13mm   • OTHER DISEASES 7/03    ASCUS Pap   • PLEVA (pityriasis lichenoides et varioliformis acuta)    • PLEVA (pityriasis lichenoides et varioliformis acuta)    • Thyr

## 2021-11-03 ENCOUNTER — TELEPHONE (OUTPATIENT)
Dept: PHYSICAL THERAPY | Facility: HOSPITAL | Age: 61
End: 2021-11-03

## 2021-11-04 ENCOUNTER — OFFICE VISIT (OUTPATIENT)
Dept: PHYSICAL THERAPY | Facility: HOSPITAL | Age: 61
End: 2021-11-04
Attending: INTERNAL MEDICINE
Payer: COMMERCIAL

## 2021-11-04 DIAGNOSIS — M72.2 PLANTAR FASCIITIS OF RIGHT FOOT: ICD-10-CM

## 2021-11-04 PROCEDURE — 97162 PT EVAL MOD COMPLEX 30 MIN: CPT

## 2021-11-04 PROCEDURE — 97110 THERAPEUTIC EXERCISES: CPT

## 2021-11-04 PROCEDURE — 97140 MANUAL THERAPY 1/> REGIONS: CPT

## 2021-11-04 NOTE — PROGRESS NOTES
LOWER EXTREMITY EVALUATION:   Referring Physician: Dr. Gordon Mccoy  Diagnosis: R plantar fasciitis     Date of Service: 11/4/2021  Insurance: Deatrice Mouse CHOICE/HMO/POS/EPO     PATIENT SUMMARY   David Alvarado is a 59year old female who presents to ther lichenoides et varioliformis acuta)    • PLEVA (pityriasis lichenoides et varioliformis acuta)    • Thyroid disease 2007    medication   • Viral conjunctivitis 09/04/2013    OD     Past Surgical History:   Procedure Laterality Date   • Benign biopsy right Flexion Fingertips to dorsal foot    L LBP   Extension Limited 15% hinging L4-5 L LBP   R Sidebend WNL    L Sidebend Limited 25% L LBP   R Rotation WNL    L Rotation Limited 25% L LBP         AROM: (* denotes performed with pain)  Hip Knee Foot/Ankle   F levels.   PLAN OF CARE:    Goals: (to be met in 12 visits)  Up to 45 min walking to be able to go grocery shopping without difficulty max 3/10 pain  Pt will be able tolerate standing at least 1 hr to be able to perform chores at home and run errands with ma

## 2021-11-11 ENCOUNTER — OFFICE VISIT (OUTPATIENT)
Dept: PHYSICAL THERAPY | Facility: HOSPITAL | Age: 61
End: 2021-11-11
Attending: INTERNAL MEDICINE
Payer: COMMERCIAL

## 2021-11-11 PROCEDURE — 97110 THERAPEUTIC EXERCISES: CPT

## 2021-11-11 PROCEDURE — 97140 MANUAL THERAPY 1/> REGIONS: CPT

## 2021-11-11 NOTE — PROGRESS NOTES
Dx: R plantar fasciitis               Insurance (Authorized # of Visits):  10 (Lian Sen)  Authorizing Physician: Dr. Chente Cano   Next MD visit: none  Precautions: has PLEVA             Subjective: Had back cramps yesterday.  VAS R he Date:   Tx#: 6/   MT · Upper and mid T/S manip  · L3-5 lumbar gapping gr 3 bilat  · R TCJ, STJ, 2nd TMT, calcaneocuboid manipulations  · STM R sciatic and Tibial nerves         EX · Open books x15 bilat  · gastroc stretch R x1 min  · Seated sciatic N glide

## 2021-11-16 ENCOUNTER — OFFICE VISIT (OUTPATIENT)
Dept: PHYSICAL THERAPY | Facility: HOSPITAL | Age: 61
End: 2021-11-16
Attending: INTERNAL MEDICINE
Payer: COMMERCIAL

## 2021-11-16 PROCEDURE — 97140 MANUAL THERAPY 1/> REGIONS: CPT

## 2021-11-16 PROCEDURE — 97110 THERAPEUTIC EXERCISES: CPT

## 2021-11-16 NOTE — PROGRESS NOTES
Dx: R plantar fasciitis               Insurance (Authorized # of Visits):  10 (Ellouise Sandhoff)  Authorizing Physician: Dr. Erin Brown   Next MD visit: none  Precautions: has PLEVA             Subjective: VAS R heel 4-5/10, lower back pain- bilat  · R TCJ, STJ, 2nd TMT, calcaneocuboid manipulations  · STM R sciatic and Tibial nerves   · Upper and mid T/S manip  · L3-5 lumbar gapping gr 3 bilat  · R TCJ, STJ, 2nd TMT, calcaneocuboid manipulations  · STM R sciatic and Tibial nerves      EX · Op

## 2021-11-18 ENCOUNTER — APPOINTMENT (OUTPATIENT)
Dept: PHYSICAL THERAPY | Facility: HOSPITAL | Age: 61
End: 2021-11-18
Attending: INTERNAL MEDICINE
Payer: COMMERCIAL

## 2021-11-18 ENCOUNTER — TELEPHONE (OUTPATIENT)
Dept: PHYSICAL THERAPY | Facility: HOSPITAL | Age: 61
End: 2021-11-18

## 2021-11-23 ENCOUNTER — OFFICE VISIT (OUTPATIENT)
Dept: PHYSICAL THERAPY | Facility: HOSPITAL | Age: 61
End: 2021-11-23
Attending: INTERNAL MEDICINE
Payer: COMMERCIAL

## 2021-11-23 PROCEDURE — 97110 THERAPEUTIC EXERCISES: CPT

## 2021-11-23 PROCEDURE — 97140 MANUAL THERAPY 1/> REGIONS: CPT

## 2021-11-23 NOTE — PROGRESS NOTES
Dx: R plantar fasciitis               Insurance (Authorized # of Visits):  10 (Chris Monday)  Authorizing Physician: Dr. Buck Johnson   Next MD visit: none  Precautions: has PLEVA             Subjective: VAS R heel 5/10 today.  [facility] Date:    Tx#: 6/   MT · Upper and mid T/S manip  · L3-5 lumbar gapping gr 3 bilat  · R TCJ, STJ, 2nd TMT, calcaneocuboid manipulations  · STM R sciatic and Tibial nerves   · Upper and mid T/S manip  · L3-5 lumbar gapping gr 3 bilat  · R TCJ, STJ,

## 2021-11-26 ENCOUNTER — APPOINTMENT (OUTPATIENT)
Dept: PHYSICAL THERAPY | Facility: HOSPITAL | Age: 61
End: 2021-11-26
Attending: INTERNAL MEDICINE
Payer: COMMERCIAL

## 2021-11-30 ENCOUNTER — OFFICE VISIT (OUTPATIENT)
Dept: PHYSICAL THERAPY | Facility: HOSPITAL | Age: 61
End: 2021-11-30
Attending: INTERNAL MEDICINE
Payer: COMMERCIAL

## 2021-11-30 PROCEDURE — 97140 MANUAL THERAPY 1/> REGIONS: CPT

## 2021-11-30 PROCEDURE — 97110 THERAPEUTIC EXERCISES: CPT

## 2021-11-30 NOTE — PROGRESS NOTES
Dx: R plantar fasciitis               Insurance (Authorized # of Visits):  10 (Kimberly Aleman)  Authorizing Physician: Dr. Alexa Villalobos   Next MD visit: none  Precautions: has PLEVA             Subjective: VAS R heel 6/10 today.  Cooked for h manip  · L3-5 lumbar gapping gr 3 bilat  · R TCJ, STJ, 2nd TMT, calcaneocuboid manipulations  · STM R sciatic and Tibial nerves · Upper and mid T/S manip  · L3-5 lumbar gapping gr 3 bilat  · R TCJ, STJ, 2nd TMT, calcaneocuboid manipulations  · STM R sciati

## 2021-12-02 ENCOUNTER — APPOINTMENT (OUTPATIENT)
Dept: PHYSICAL THERAPY | Facility: HOSPITAL | Age: 61
End: 2021-12-02
Attending: INTERNAL MEDICINE
Payer: COMMERCIAL

## 2021-12-14 ENCOUNTER — OFFICE VISIT (OUTPATIENT)
Dept: PHYSICAL THERAPY | Facility: HOSPITAL | Age: 61
End: 2021-12-14
Attending: INTERNAL MEDICINE
Payer: COMMERCIAL

## 2021-12-14 PROCEDURE — 97140 MANUAL THERAPY 1/> REGIONS: CPT

## 2021-12-14 PROCEDURE — 97110 THERAPEUTIC EXERCISES: CPT

## 2021-12-14 NOTE — PROGRESS NOTES
Dx: R plantar fasciitis               Insurance (Authorized # of Visits):  10 (Pako Soriano)  Authorizing Physician: Dr. Heri Dominguez   Next MD visit: none  Precautions: has PLEVA             Subjective: VAS R heel 7-8/10.  Had back spasms 2nd TMT, calcaneocuboid manipulations  · STM R sciatic and Tibial nerves  · Long axis traction SIJ manip R · Upper and mid T/S manip  · L3-5 lumbar gapping gr 3 bilat  · R TCJ, STJ, 2nd TMT, calcaneocuboid manipulations  · STM R sciatic and Tibial nerves

## 2022-05-06 ENCOUNTER — TELEPHONE (OUTPATIENT)
Dept: INTERNAL MEDICINE CLINIC | Facility: CLINIC | Age: 62
End: 2022-05-06

## 2022-05-06 LAB — AMB EXT COVID-19 RESULT: DETECTED

## 2022-05-06 NOTE — TELEPHONE ENCOUNTER
Patient calling ( identified name and  ) took a home COVID  Test and it is positive    Has symptoms of cough, tired, chills , onset since Tuesday     Has been taking Tylenol , Ricola drops , feel better than Tuesday     Reviewed with patient to call if develops any new/worsening symptoms but ER if develops moderate-severe SOB or chest pain, and patient  voiced understanding and agrees    Advised to call back with any questions/ concerns       Reviewed the following :    10 THINGS YOU CAN DO TO MANAGE YOUR [address]    If you have possible or confirmed COVID-19    1. Stay home except to get medical care  2. Monitor your symptoms carefully. If your symptoms get worse, call your healthcare provider immediately. 3. Get rest and stay hydrated. 4. If you have a medical appointment, call the healthcare provider ahead of time and tell them you have or may have COVID-19.  5. For medical emergencies, call 911 and notify the dispatch personnel that you have or may have COVID-19.  6. Cover your cough and sneezes with a tissue or use the inside of your elbow. 7. Wash your hands often with soap and water for at least 20 seconds or clean hands with an alcohol-based hand  that contains at least 60% alcohol. 8. As much as possible, stay in a specific room and away from other people in your home. Also, you should use a separate bathroom, if available. If you need to be around other people in or outside of the home, wear a mask. 9. Avoid sharing personal items with other people in your household, like dishes, towels, and bedding. 10. Clean all surfaces that are touched often, like counters, tabletops, and doorknobs. Use household cleaning sprays or wipes according to the label instructions.     cdc.gov/coronavirus

## 2022-07-01 ENCOUNTER — OFFICE VISIT (OUTPATIENT)
Dept: INTERNAL MEDICINE CLINIC | Facility: CLINIC | Age: 62
End: 2022-07-01
Payer: COMMERCIAL

## 2022-07-01 ENCOUNTER — NURSE TRIAGE (OUTPATIENT)
Dept: INTERNAL MEDICINE CLINIC | Facility: CLINIC | Age: 62
End: 2022-07-01

## 2022-07-01 VITALS
WEIGHT: 188 LBS | DIASTOLIC BLOOD PRESSURE: 77 MMHG | HEIGHT: 65 IN | BODY MASS INDEX: 31.32 KG/M2 | SYSTOLIC BLOOD PRESSURE: 116 MMHG | HEART RATE: 64 BPM

## 2022-07-01 DIAGNOSIS — L03.116 CELLULITIS OF LEFT LOWER EXTREMITY: Primary | ICD-10-CM

## 2022-07-01 DIAGNOSIS — Z12.31 ENCOUNTER FOR SCREENING MAMMOGRAM FOR MALIGNANT NEOPLASM OF BREAST: ICD-10-CM

## 2022-07-01 PROCEDURE — 99213 OFFICE O/P EST LOW 20 MIN: CPT | Performed by: NURSE PRACTITIONER

## 2022-07-01 PROCEDURE — 3008F BODY MASS INDEX DOCD: CPT | Performed by: NURSE PRACTITIONER

## 2022-07-01 PROCEDURE — 3078F DIAST BP <80 MM HG: CPT | Performed by: NURSE PRACTITIONER

## 2022-07-01 PROCEDURE — 3074F SYST BP LT 130 MM HG: CPT | Performed by: NURSE PRACTITIONER

## 2022-07-01 RX ORDER — AMOXICILLIN AND CLAVULANATE POTASSIUM 875; 125 MG/1; MG/1
1 TABLET, FILM COATED ORAL 2 TIMES DAILY
Qty: 10 TABLET | Refills: 0 | Status: SHIPPED | OUTPATIENT
Start: 2022-07-01 | End: 2022-07-06

## 2022-07-21 ENCOUNTER — NURSE TRIAGE (OUTPATIENT)
Dept: INTERNAL MEDICINE CLINIC | Facility: CLINIC | Age: 62
End: 2022-07-21

## 2022-07-22 ENCOUNTER — OFFICE VISIT (OUTPATIENT)
Dept: INTERNAL MEDICINE CLINIC | Facility: CLINIC | Age: 62
End: 2022-07-22
Payer: COMMERCIAL

## 2022-07-22 VITALS
BODY MASS INDEX: 31.82 KG/M2 | WEIGHT: 191 LBS | HEART RATE: 73 BPM | SYSTOLIC BLOOD PRESSURE: 113 MMHG | HEIGHT: 65 IN | DIASTOLIC BLOOD PRESSURE: 69 MMHG

## 2022-07-22 DIAGNOSIS — R23.8 PAPULE OF SKIN: Primary | ICD-10-CM

## 2022-07-22 PROCEDURE — 3008F BODY MASS INDEX DOCD: CPT | Performed by: NURSE PRACTITIONER

## 2022-07-22 PROCEDURE — 3078F DIAST BP <80 MM HG: CPT | Performed by: NURSE PRACTITIONER

## 2022-07-22 PROCEDURE — 99213 OFFICE O/P EST LOW 20 MIN: CPT | Performed by: NURSE PRACTITIONER

## 2022-07-22 PROCEDURE — 3074F SYST BP LT 130 MM HG: CPT | Performed by: NURSE PRACTITIONER

## 2022-07-22 RX ORDER — LEVOTHYROXINE SODIUM 100 %
POWDER (GRAM) MISCELLANEOUS AS DIRECTED
COMMUNITY
End: 2022-07-22

## 2022-08-02 ENCOUNTER — LAB ENCOUNTER (OUTPATIENT)
Dept: LAB | Facility: HOSPITAL | Age: 62
End: 2022-08-02
Attending: NURSE PRACTITIONER
Payer: COMMERCIAL

## 2022-08-02 ENCOUNTER — OFFICE VISIT (OUTPATIENT)
Dept: INTERNAL MEDICINE CLINIC | Facility: CLINIC | Age: 62
End: 2022-08-02
Payer: COMMERCIAL

## 2022-08-02 VITALS
SYSTOLIC BLOOD PRESSURE: 127 MMHG | WEIGHT: 189.13 LBS | BODY MASS INDEX: 31.51 KG/M2 | DIASTOLIC BLOOD PRESSURE: 82 MMHG | HEART RATE: 56 BPM | HEIGHT: 65 IN

## 2022-08-02 DIAGNOSIS — Z13.820 SCREENING FOR OSTEOPOROSIS: ICD-10-CM

## 2022-08-02 DIAGNOSIS — L65.9 HAIR THINNING: ICD-10-CM

## 2022-08-02 DIAGNOSIS — Z12.11 COLON CANCER SCREENING: Primary | ICD-10-CM

## 2022-08-02 DIAGNOSIS — R92.8 ABNORMAL FINDING ON BREAST IMAGING: ICD-10-CM

## 2022-08-02 DIAGNOSIS — Z00.00 ANNUAL PHYSICAL EXAM: ICD-10-CM

## 2022-08-02 LAB
ALBUMIN SERPL-MCNC: 3.9 G/DL (ref 3.4–5)
ALBUMIN/GLOB SERPL: 1.2 {RATIO} (ref 1–2)
ALP LIVER SERPL-CCNC: 77 U/L
ALT SERPL-CCNC: 41 U/L
ANION GAP SERPL CALC-SCNC: 6 MMOL/L (ref 0–18)
AST SERPL-CCNC: 28 U/L (ref 15–37)
BASOPHILS # BLD AUTO: 0.07 X10(3) UL (ref 0–0.2)
BASOPHILS NFR BLD AUTO: 0.9 %
BILIRUB SERPL-MCNC: 0.7 MG/DL (ref 0.1–2)
BILIRUB UR QL: NEGATIVE
BUN BLD-MCNC: 11 MG/DL (ref 7–18)
BUN/CREAT SERPL: 15.7 (ref 10–20)
CALCIUM BLD-MCNC: 9.7 MG/DL (ref 8.5–10.1)
CHLORIDE SERPL-SCNC: 107 MMOL/L (ref 98–112)
CHOLEST SERPL-MCNC: 170 MG/DL (ref ?–200)
CLARITY UR: CLEAR
CO2 SERPL-SCNC: 28 MMOL/L (ref 21–32)
COLOR UR: YELLOW
CREAT BLD-MCNC: 0.7 MG/DL
DEPRECATED RDW RBC AUTO: 44.1 FL (ref 35.1–46.3)
EOSINOPHIL # BLD AUTO: 0.1 X10(3) UL (ref 0–0.7)
EOSINOPHIL NFR BLD AUTO: 1.3 %
ERYTHROCYTE [DISTWIDTH] IN BLOOD BY AUTOMATED COUNT: 12.7 % (ref 11–15)
FASTING PATIENT LIPID ANSWER: YES
FASTING STATUS PATIENT QL REPORTED: YES
GFR SERPLBLD BASED ON 1.73 SQ M-ARVRAT: 97 ML/MIN/1.73M2 (ref 60–?)
GLOBULIN PLAS-MCNC: 3.2 G/DL (ref 2.8–4.4)
GLUCOSE BLD-MCNC: 85 MG/DL (ref 70–99)
GLUCOSE UR-MCNC: NEGATIVE MG/DL
HCT VFR BLD AUTO: 42.7 %
HDLC SERPL-MCNC: 62 MG/DL (ref 40–59)
HGB BLD-MCNC: 14.2 G/DL
IMM GRANULOCYTES # BLD AUTO: 0.02 X10(3) UL (ref 0–1)
IMM GRANULOCYTES NFR BLD: 0.3 %
KETONES UR-MCNC: NEGATIVE MG/DL
LDLC SERPL CALC-MCNC: 95 MG/DL (ref ?–100)
LEUKOCYTE ESTERASE UR QL STRIP.AUTO: NEGATIVE
LYMPHOCYTES # BLD AUTO: 2.37 X10(3) UL (ref 1–4)
LYMPHOCYTES NFR BLD AUTO: 31 %
MCH RBC QN AUTO: 31.3 PG (ref 26–34)
MCHC RBC AUTO-ENTMCNC: 33.3 G/DL (ref 31–37)
MCV RBC AUTO: 94.1 FL
MONOCYTES # BLD AUTO: 0.59 X10(3) UL (ref 0.1–1)
MONOCYTES NFR BLD AUTO: 7.7 %
NEUTROPHILS # BLD AUTO: 4.49 X10 (3) UL (ref 1.5–7.7)
NEUTROPHILS # BLD AUTO: 4.49 X10(3) UL (ref 1.5–7.7)
NEUTROPHILS NFR BLD AUTO: 58.8 %
NITRITE UR QL STRIP.AUTO: NEGATIVE
NONHDLC SERPL-MCNC: 108 MG/DL (ref ?–130)
OSMOLALITY SERPL CALC.SUM OF ELEC: 291 MOSM/KG (ref 275–295)
PH UR: 7 [PH] (ref 5–8)
PLATELET # BLD AUTO: 229 10(3)UL (ref 150–450)
POTASSIUM SERPL-SCNC: 4.4 MMOL/L (ref 3.5–5.1)
PROT SERPL-MCNC: 7.1 G/DL (ref 6.4–8.2)
PROT UR-MCNC: NEGATIVE MG/DL
RBC # BLD AUTO: 4.54 X10(6)UL
SODIUM SERPL-SCNC: 141 MMOL/L (ref 136–145)
SP GR UR STRIP: 1.01 (ref 1–1.03)
T3FREE SERPL-MCNC: 2.77 PG/ML (ref 2.4–4.2)
TRIGL SERPL-MCNC: 67 MG/DL (ref 30–149)
TSI SER-ACNC: 1.83 MIU/ML (ref 0.36–3.74)
UROBILINOGEN UR STRIP-ACNC: 0.2
VIT B12 SERPL-MCNC: 588 PG/ML (ref 193–986)
VIT D+METAB SERPL-MCNC: 21.4 NG/ML (ref 30–100)
VLDLC SERPL CALC-MCNC: 11 MG/DL (ref 0–30)
WBC # BLD AUTO: 7.6 X10(3) UL (ref 4–11)

## 2022-08-02 PROCEDURE — 80053 COMPREHEN METABOLIC PANEL: CPT | Performed by: NURSE PRACTITIONER

## 2022-08-02 PROCEDURE — 80061 LIPID PANEL: CPT | Performed by: NURSE PRACTITIONER

## 2022-08-02 PROCEDURE — 82607 VITAMIN B-12: CPT | Performed by: NURSE PRACTITIONER

## 2022-08-02 PROCEDURE — 82306 VITAMIN D 25 HYDROXY: CPT | Performed by: NURSE PRACTITIONER

## 2022-08-02 PROCEDURE — 99396 PREV VISIT EST AGE 40-64: CPT | Performed by: NURSE PRACTITIONER

## 2022-08-02 PROCEDURE — 3079F DIAST BP 80-89 MM HG: CPT | Performed by: NURSE PRACTITIONER

## 2022-08-02 PROCEDURE — 81015 MICROSCOPIC EXAM OF URINE: CPT | Performed by: NURSE PRACTITIONER

## 2022-08-02 PROCEDURE — 85025 COMPLETE CBC W/AUTO DIFF WBC: CPT | Performed by: NURSE PRACTITIONER

## 2022-08-02 PROCEDURE — 81001 URINALYSIS AUTO W/SCOPE: CPT | Performed by: NURSE PRACTITIONER

## 2022-08-02 PROCEDURE — 84481 FREE ASSAY (FT-3): CPT | Performed by: NURSE PRACTITIONER

## 2022-08-02 PROCEDURE — 3008F BODY MASS INDEX DOCD: CPT | Performed by: NURSE PRACTITIONER

## 2022-08-02 PROCEDURE — 3074F SYST BP LT 130 MM HG: CPT | Performed by: NURSE PRACTITIONER

## 2022-08-02 PROCEDURE — 84443 ASSAY THYROID STIM HORMONE: CPT | Performed by: NURSE PRACTITIONER

## 2022-08-02 PROCEDURE — 36415 COLL VENOUS BLD VENIPUNCTURE: CPT | Performed by: NURSE PRACTITIONER

## 2022-08-02 NOTE — ASSESSMENT & PLAN NOTE
Patient complaining of hair thinning. Hair pull test is negative. Plan  May want to try biotin to thicken hair. Thyroid panel ordered.

## 2022-08-08 ENCOUNTER — TELEPHONE (OUTPATIENT)
Dept: INTERNAL MEDICINE CLINIC | Facility: CLINIC | Age: 62
End: 2022-08-08

## 2022-08-08 NOTE — TELEPHONE ENCOUNTER
Spoke with pt,  verified, pt stated she didn't get her 6 months RT breast diagnostic mammogram done that was due last 2021, then on 22 KRakel LIGHT ordered a bliateral screening mammo. Pt wants to know if she should do her RT breat diagnostic or bilat screening mammo? pls advise, thanks in advance.              Future Appointments   Date Time Provider Kolby Ward   2022  8:40 AM [facility]   2022  1:00 PM [facility] DEXA 1 [facility] DEXA [facility]

## 2022-08-09 NOTE — TELEPHONE ENCOUNTER
Please get the right diagnostic exam first.    SUE Benton  Working with [facility]

## 2022-08-09 NOTE — TELEPHONE ENCOUNTER
Spoke with pt,  verified  Pt was informed of [address] recommendation, pt stated understanding. She will let Dr Christina Yu (obgregoria) from [facility]  send copy of her dexascan result done on . [facility] fax # given to pt.        NARCISO

## 2022-08-16 ENCOUNTER — TELEPHONE (OUTPATIENT)
Dept: INTERNAL MEDICINE CLINIC | Facility: CLINIC | Age: 62
End: 2022-08-16

## 2022-08-16 ENCOUNTER — HOSPITAL ENCOUNTER (OUTPATIENT)
Dept: MAMMOGRAPHY | Facility: HOSPITAL | Age: 62
Discharge: HOME | End: 2022-08-16
Attending: NURSE PRACTITIONER
Payer: COMMERCIAL

## 2022-08-16 DIAGNOSIS — Z00.00 ANNUAL PHYSICAL EXAM: ICD-10-CM

## 2022-08-16 DIAGNOSIS — R92.8 ABNORMAL FINDING ON BREAST IMAGING: Primary | ICD-10-CM

## 2022-08-16 DIAGNOSIS — R92.8 ABNORMAL FINDING ON BREAST IMAGING: ICD-10-CM

## 2022-08-16 PROCEDURE — 77062 BREAST TOMOSYNTHESIS BI: CPT | Performed by: NURSE PRACTITIONER

## 2022-08-16 PROCEDURE — 77066 DX MAMMO INCL CAD BI: CPT | Performed by: NURSE PRACTITIONER

## 2022-08-16 NOTE — TELEPHONE ENCOUNTER
Mammogram dept called stating patients mammogram order is incorrect and needs to be placed as diagnostic bilateral.     Pended order please review and sign if appropriate.

## 2022-08-23 ENCOUNTER — LAB ENCOUNTER (OUTPATIENT)
Dept: LAB | Facility: HOSPITAL | Age: 62
End: 2022-08-23
Attending: DERMATOLOGY
Payer: COMMERCIAL

## 2022-08-23 DIAGNOSIS — L30.9 ACUTE DERMATITIS: Primary | ICD-10-CM

## 2022-08-23 LAB
ASO AB SERPL-ACNC: <50 IU/L (ref ?–408)
ERYTHROCYTE [SEDIMENTATION RATE] IN BLOOD: 9 MM/HR

## 2022-08-23 PROCEDURE — 36415 COLL VENOUS BLD VENIPUNCTURE: CPT

## 2022-08-23 PROCEDURE — 85652 RBC SED RATE AUTOMATED: CPT

## 2022-08-23 PROCEDURE — 86060 ANTISTREPTOLYSIN O TITER: CPT

## 2022-08-23 PROCEDURE — 86038 ANTINUCLEAR ANTIBODIES: CPT

## 2022-08-25 LAB — NUCLEAR IGG TITR SER IF: NEGATIVE {TITER}

## 2022-08-31 ENCOUNTER — HOSPITAL ENCOUNTER (OUTPATIENT)
Dept: BONE DENSITY | Facility: HOSPITAL | Age: 62
Discharge: HOME | End: 2022-08-31
Attending: NURSE PRACTITIONER
Payer: COMMERCIAL

## 2022-08-31 DIAGNOSIS — Z13.820 SCREENING FOR OSTEOPOROSIS: ICD-10-CM

## 2022-08-31 PROCEDURE — 77080 DXA BONE DENSITY AXIAL: CPT | Performed by: NURSE PRACTITIONER

## 2022-09-29 ENCOUNTER — NURSE TRIAGE (OUTPATIENT)
Dept: INTERNAL MEDICINE CLINIC | Facility: CLINIC | Age: 62
End: 2022-09-29

## 2022-09-30 NOTE — TELEPHONE ENCOUNTER
Please call patient back and she is to see a audiologists.     Please provide her with a number to one of our audiologist.    SUE Cruz  Working with [facility]

## 2022-10-04 ENCOUNTER — OFFICE VISIT (OUTPATIENT)
Dept: OTOLARYNGOLOGY | Facility: CLINIC | Age: 62
End: 2022-10-04
Payer: COMMERCIAL

## 2022-10-04 ENCOUNTER — OFFICE VISIT (OUTPATIENT)
Dept: AUDIOLOGY | Facility: CLINIC | Age: 62
End: 2022-10-04
Payer: COMMERCIAL

## 2022-10-04 DIAGNOSIS — H90.3 SENSORINEURAL HEARING LOSS, BILATERAL: Primary | ICD-10-CM

## 2022-10-04 DIAGNOSIS — H91.90 HEARING LOSS, UNSPECIFIED HEARING LOSS TYPE, UNSPECIFIED LATERALITY: Primary | ICD-10-CM

## 2022-10-04 PROCEDURE — 99203 OFFICE O/P NEW LOW 30 MIN: CPT | Performed by: STUDENT IN AN ORGANIZED HEALTH CARE EDUCATION/TRAINING PROGRAM

## 2022-10-04 PROCEDURE — 92557 COMPREHENSIVE HEARING TEST: CPT | Performed by: AUDIOLOGIST

## 2022-10-04 PROCEDURE — 92567 TYMPANOMETRY: CPT | Performed by: AUDIOLOGIST

## 2022-10-04 PROCEDURE — 92504 EAR MICROSCOPY EXAMINATION: CPT | Performed by: STUDENT IN AN ORGANIZED HEALTH CARE EDUCATION/TRAINING PROGRAM

## 2022-10-04 RX ORDER — METRONIDAZOLE 7.5 MG/G
GEL TOPICAL
COMMUNITY
Start: 2022-08-23

## 2022-10-04 RX ORDER — NICOTINE POLACRILEX 2 MG
LOZENGE BUCCAL AS DIRECTED
COMMUNITY

## 2022-10-04 RX ORDER — BESIFLOXACIN 6 MG/ML
1 SUSPENSION OPHTHALMIC 3 TIMES DAILY
COMMUNITY
Start: 2021-12-14

## 2022-10-04 RX ORDER — TRIAMCINOLONE ACETONIDE 1 MG/G
CREAM TOPICAL
COMMUNITY
Start: 2022-08-23

## 2022-10-04 RX ORDER — MAGNESIUM 200 MG
350 TABLET ORAL DAILY
COMMUNITY

## 2022-10-04 RX ORDER — KETOCONAZOLE 20 MG/ML
SHAMPOO TOPICAL
COMMUNITY
Start: 2022-09-21

## 2022-10-18 DIAGNOSIS — H90.3 SENSORINEURAL HEARING LOSS, BILATERAL: Primary | ICD-10-CM

## 2022-11-09 ENCOUNTER — OFFICE VISIT (OUTPATIENT)
Dept: AUDIOLOGY | Facility: CLINIC | Age: 62
End: 2022-11-09
Payer: COMMERCIAL

## 2022-11-09 DIAGNOSIS — H90.3 SENSORINEURAL HEARING LOSS, BILATERAL: Primary | ICD-10-CM

## 2022-11-09 PROCEDURE — 92591 HEARING AID EXAM, BOTH EARS: CPT | Performed by: AUDIOLOGIST

## 2022-11-09 NOTE — PROGRESS NOTES
Hearing Aid Evaluation    Trang Astorga  [birth year]  [id number]    Trang Astorga is a first time user. The following were discussed with Prudence Oscar:    Explanation of Audiogram  Patient's hearing loss  Communication difficulties  Importance of binaural instruments  Performance of noise  Benefits/limitations of style  Adjustment period and follow up visits    Hearing Aid Features  Automatic selection  Dual microphones  Noise suppression  Compatible with Bluetooth    Charges Associated with Hearing Aids  Hearing aid evaluation  Hearing aid(s)  Custom earmolds  Payment in full at delivery  Batteries  Office visits after service agreement ends      Medical clearance was given by Dr. Telma Licea   . Patient's Choice  Level of technology: TBD  Fitting: binaural    Hearing Aid Ordered    Otoscopic Inspection:  both ears: no cerumen and TM visualized    Earmold impressions were taken without incident. There was much discussion about her insurance coverage and whether she had to stay within the benefit amount ( 2500.00) or if we could balance bill    Patient wishes a mini rite ( not rechargeable)   : 85  Length: 3  Custom micromold   Color: [id number]  Bluetooth compatible                                            Patient will look into her insurance and find out if what level she can pursue.   Leaning towards option 2- patient will call       Impressions on our counter - order not placed until we hear from patient     Patient has sent an appointment for  on 12/27/2022.    11/9/2022  Lauren Cai

## 2022-11-21 ENCOUNTER — AUDIOLOGY DOCUMENTATION (OUTPATIENT)
Dept: AUDIOLOGY | Facility: CLINIC | Age: 62
End: 2022-11-21

## 2022-11-21 NOTE — PROGRESS NOTES
Patient cancelled follow-up appt, and did not call back with level of hearing aid technology. Will assume she went elsewhere for hearing aid services.   No hearing aids were ordered at this time

## 2022-11-23 ENCOUNTER — TELEMEDICINE (OUTPATIENT)
Dept: TELEHEALTH | Age: 62
End: 2022-11-23

## 2022-11-23 DIAGNOSIS — Z02.9 ADMINISTRATIVE ENCOUNTER: Primary | ICD-10-CM

## 2022-11-25 NOTE — PROGRESS NOTES
Patient presented via Video Visit on Demand. Patient states that he has some right upper dental problems recently which was worked on by her DDS. Patient has developed redness, warmth and swelling. The erythema and swelling is noted to her right side of her face into the scalp and swelling into the right lateral orbit of her eye and down the right side of her face to the level of her right ear lobe. She feels swollen, tender glands under her right jaw. Patient states that she felt ill and had some chills last night. Patient is conducting the visit from her bed. Patient was advised to be seen in the ED. Likely will need IV Antibiotics and other evaluation and treatment. Patient states that she is in [facility] visiting her son. Advised that he take her to an Emergency Department. Patient states that she will comply with this. Video Visit canceled with no charge.

## 2022-11-28 ENCOUNTER — TELEPHONE (OUTPATIENT)
Dept: INTERNAL MEDICINE CLINIC | Facility: CLINIC | Age: 62
End: 2022-11-28

## 2022-11-28 NOTE — TELEPHONE ENCOUNTER
Patient contacted and informed that she must contact [id number] records Dept. in [facility] and have them fax  discharge summery and lab results to Dr Celina Smith fax [phone]. It is required by their Medical Records Dept. for her to sign authorization to release any information to us. Patient states that she will e-mail Hospital and complete request authorization online. No

## 2022-11-28 NOTE — TELEPHONE ENCOUNTER
Patient requesting Dr. Lacy Walter office to contact [facility] to obtain medical records, from ER visit on 11-23-22. Ph# to records department in [facility] [phone], Pat Renee has appointment with Dr. Gabriela Lauren on 11-30.

## 2022-11-30 ENCOUNTER — OFFICE VISIT (OUTPATIENT)
Dept: INTERNAL MEDICINE CLINIC | Facility: CLINIC | Age: 62
End: 2022-11-30
Payer: COMMERCIAL

## 2022-11-30 VITALS
BODY MASS INDEX: 31.65 KG/M2 | HEART RATE: 68 BPM | SYSTOLIC BLOOD PRESSURE: 126 MMHG | DIASTOLIC BLOOD PRESSURE: 80 MMHG | HEIGHT: 65 IN | WEIGHT: 190 LBS | RESPIRATION RATE: 18 BRPM | TEMPERATURE: 98 F

## 2022-11-30 DIAGNOSIS — L03.211 CELLULITIS OF FACE: Primary | ICD-10-CM

## 2022-11-30 PROCEDURE — 3008F BODY MASS INDEX DOCD: CPT | Performed by: INTERNAL MEDICINE

## 2022-11-30 PROCEDURE — 3074F SYST BP LT 130 MM HG: CPT | Performed by: INTERNAL MEDICINE

## 2022-11-30 PROCEDURE — 99213 OFFICE O/P EST LOW 20 MIN: CPT | Performed by: INTERNAL MEDICINE

## 2022-11-30 PROCEDURE — 3079F DIAST BP 80-89 MM HG: CPT | Performed by: INTERNAL MEDICINE

## 2022-11-30 RX ORDER — AMOXICILLIN AND CLAVULANATE POTASSIUM 875; 125 MG/1; MG/1
1 TABLET, FILM COATED ORAL EVERY 12 HOURS
COMMUNITY
Start: 2022-11-23

## 2023-08-03 ENCOUNTER — TELEPHONE (OUTPATIENT)
Dept: INTERNAL MEDICINE CLINIC | Facility: CLINIC | Age: 63
End: 2023-08-03

## 2023-08-03 DIAGNOSIS — Z12.11 SCREENING FOR COLON CANCER: Primary | ICD-10-CM

## 2023-08-03 NOTE — TELEPHONE ENCOUNTER
Patient is requesting a colon screening order be placed for a colonoscopy and a recommendation on the provider. Please advise patient when order is confirmed for scheduling  Please advise.

## 2023-08-30 ENCOUNTER — HOSPITAL ENCOUNTER (OUTPATIENT)
Age: 63
Discharge: HOME | End: 2023-08-30
Attending: EMERGENCY MEDICINE
Payer: COMMERCIAL

## 2023-08-30 ENCOUNTER — NURSE TRIAGE (OUTPATIENT)
Dept: INTERNAL MEDICINE CLINIC | Facility: CLINIC | Age: 63
End: 2023-08-30

## 2023-08-30 VITALS
OXYGEN SATURATION: 99 % | HEART RATE: 57 BPM | TEMPERATURE: 98 F | SYSTOLIC BLOOD PRESSURE: 137 MMHG | RESPIRATION RATE: 20 BRPM | DIASTOLIC BLOOD PRESSURE: 68 MMHG

## 2023-08-30 DIAGNOSIS — H60.11 CELLULITIS OF EAR, RIGHT: Primary | ICD-10-CM

## 2023-08-30 PROCEDURE — 99213 OFFICE O/P EST LOW 20 MIN: CPT

## 2023-08-30 PROCEDURE — 99214 OFFICE O/P EST MOD 30 MIN: CPT

## 2023-08-30 RX ORDER — CEFADROXIL 500 MG/1
500 CAPSULE ORAL 2 TIMES DAILY
Qty: 20 CAPSULE | Refills: 0 | Status: SHIPPED | OUTPATIENT
Start: 2023-08-30 | End: 2023-09-09

## 2023-09-06 ENCOUNTER — OFFICE VISIT (OUTPATIENT)
Dept: INTERNAL MEDICINE CLINIC | Facility: CLINIC | Age: 63
End: 2023-09-06

## 2023-09-06 VITALS
HEIGHT: 65 IN | RESPIRATION RATE: 18 BRPM | DIASTOLIC BLOOD PRESSURE: 70 MMHG | WEIGHT: 187.81 LBS | BODY MASS INDEX: 31.29 KG/M2 | SYSTOLIC BLOOD PRESSURE: 122 MMHG | HEART RATE: 60 BPM | TEMPERATURE: 98 F

## 2023-09-06 DIAGNOSIS — L03.811 CELLULITIS OF HEAD EXCEPT FACE: Primary | ICD-10-CM

## 2023-09-06 PROCEDURE — 99213 OFFICE O/P EST LOW 20 MIN: CPT | Performed by: INTERNAL MEDICINE

## 2023-09-06 PROCEDURE — 3074F SYST BP LT 130 MM HG: CPT | Performed by: INTERNAL MEDICINE

## 2023-09-06 PROCEDURE — 3078F DIAST BP <80 MM HG: CPT | Performed by: INTERNAL MEDICINE

## 2023-09-06 PROCEDURE — 3008F BODY MASS INDEX DOCD: CPT | Performed by: INTERNAL MEDICINE

## 2023-09-08 ENCOUNTER — NURSE TRIAGE (OUTPATIENT)
Dept: INTERNAL MEDICINE CLINIC | Facility: CLINIC | Age: 63
End: 2023-09-08

## 2023-09-09 ENCOUNTER — OFFICE VISIT (OUTPATIENT)
Dept: INTERNAL MEDICINE CLINIC | Facility: CLINIC | Age: 63
End: 2023-09-09

## 2023-09-09 VITALS
HEART RATE: 65 BPM | HEIGHT: 65 IN | BODY MASS INDEX: 31.16 KG/M2 | SYSTOLIC BLOOD PRESSURE: 122 MMHG | WEIGHT: 187 LBS | OXYGEN SATURATION: 97 % | DIASTOLIC BLOOD PRESSURE: 78 MMHG

## 2023-09-09 DIAGNOSIS — L03.811 CELLULITIS OF HEAD EXCEPT FACE: Primary | ICD-10-CM

## 2023-09-09 PROCEDURE — 3074F SYST BP LT 130 MM HG: CPT

## 2023-09-09 PROCEDURE — 3008F BODY MASS INDEX DOCD: CPT

## 2023-09-09 PROCEDURE — 3078F DIAST BP <80 MM HG: CPT

## 2023-09-09 PROCEDURE — 99214 OFFICE O/P EST MOD 30 MIN: CPT

## 2023-09-09 RX ORDER — CLOTRIMAZOLE 1 %
1 CREAM (GRAM) TOPICAL 2 TIMES DAILY
Qty: 28 G | Refills: 0 | Status: SHIPPED | OUTPATIENT
Start: 2023-09-09

## 2023-09-11 NOTE — TELEPHONE ENCOUNTER
Spoke with the patient,who  stated that it is getting better. Patient would like to know for how long   she needs to continue using the cream and ointment . RN instructed the patient to continue using them until her skin was  completely healed and back to normal,stating  understanding. Patient would like to thank and give good words to  Lillie Leonardo, who states  \" she is completely wonderful,lena and knowledgeable \". .        Was seen by Lillie Leonardo , on 9/9/23; Assessment & Plan:   1. Cellulitis of head except face (Primary)  -     Clotrimazole; Apply 1 Application topically 2 (two) times daily.   Dispense: 28 g; Refill: 0  Improving; possible fungal component; continue mupirocin and add antifungal; reassess with dermatology           Future Appointments   Date Time Provider Kolby Sylvia   12/6/2023  2:30 PM Jasmyn Mcclendon MD [facility]

## 2023-12-06 ENCOUNTER — OFFICE VISIT (OUTPATIENT)
Dept: GASTROENTEROLOGY | Facility: CLINIC | Age: 63
End: 2023-12-06

## 2023-12-06 ENCOUNTER — TELEPHONE (OUTPATIENT)
Dept: GASTROENTEROLOGY | Facility: CLINIC | Age: 63
End: 2023-12-06

## 2023-12-06 VITALS
SYSTOLIC BLOOD PRESSURE: 109 MMHG | DIASTOLIC BLOOD PRESSURE: 68 MMHG | BODY MASS INDEX: 32.69 KG/M2 | HEART RATE: 67 BPM | HEIGHT: 65 IN | WEIGHT: 196.19 LBS

## 2023-12-06 DIAGNOSIS — Z12.11 COLON CANCER SCREENING: Primary | ICD-10-CM

## 2023-12-06 DIAGNOSIS — R10.9 ABDOMINAL PAIN, UNSPECIFIED ABDOMINAL LOCATION: ICD-10-CM

## 2023-12-06 DIAGNOSIS — K59.00 CONSTIPATION, UNSPECIFIED CONSTIPATION TYPE: Primary | ICD-10-CM

## 2023-12-06 DIAGNOSIS — Z12.11 ENCOUNTER FOR SCREENING COLONOSCOPY: ICD-10-CM

## 2023-12-06 PROCEDURE — 3008F BODY MASS INDEX DOCD: CPT | Performed by: INTERNAL MEDICINE

## 2023-12-06 PROCEDURE — 3074F SYST BP LT 130 MM HG: CPT | Performed by: INTERNAL MEDICINE

## 2023-12-06 PROCEDURE — 99204 OFFICE O/P NEW MOD 45 MIN: CPT | Performed by: INTERNAL MEDICINE

## 2023-12-06 PROCEDURE — 3078F DIAST BP <80 MM HG: CPT | Performed by: INTERNAL MEDICINE

## 2023-12-06 RX ORDER — KETOCONAZOLE 20 MG/ML
2 SHAMPOO TOPICAL
COMMUNITY
Start: 2023-10-26

## 2023-12-06 RX ORDER — TAVABOROLE TOPICAL SOLUTION, 5% 43.5 MG/ML
1 SOLUTION TOPICAL AS DIRECTED
COMMUNITY

## 2023-12-06 RX ORDER — CLOBETASOL PROPIONATE 0.46 MG/ML
1 SOLUTION TOPICAL 2 TIMES DAILY
COMMUNITY
Start: 2023-10-26

## 2023-12-06 NOTE — PATIENT INSTRUCTIONS
. Constipation, unspecified constipation type    2. Abdominal pain, unspecified abdominal location    3. Encounter for screening colonoscopy      Recommend:  Avoid Senna products - check your tea  Ok to do miralax, colace, milk mag  -Schedule colonoscopy w/MAC for screening  -Prep: -Buy over the counter dulcolax laxative, and take daily for 3 days prior to drinking the bowel prep.    suprep or trilyte or equivalent    ** If MAC @ [facility]/[place]:    - NO alcohol, recreational drugs nor erectile dysfunction mediations 24 hours before procedure(s)   - NO herbal supplements or weight loss medications x 7 days prior to the procedure(s)    ** [facility] - continue all medications as prescribed

## 2023-12-06 NOTE — TELEPHONE ENCOUNTER
Scheduled for:  Colonoscopy [id number]  Provider Name:  Dr. Rafa Vargas  Date:  3/12/24  Location:[facility]  Sedation:  MAC  Time:  2537 Am (pt is aware that [facility]a [address] will call the day before to confirm arrival time)   Prep:  Buy over the counter dulcolax laxative, and take daily for 3 days prior to drinking the bowel prep. suprep or trilyte or equivalent  Meds/Allergies Reconciled?:  Physician Reviewed   Diagnosis with codes:  Colon cancer screening Z12.11  Was patient informed to call insurance with codes (Y/N):  Yes  Referral sent?:  Referral was sent at the time of electronic surgical scheduling. [address] [place] [address] or [facility] notified?:  I sent an electronic request to Endo Scheduling and received a confirmation today. Medication Orders:  Pt is aware to NOT take iron pills, herbal meds and diet supplements for 7 days before exam. Also to NOT take any form of alcohol, recreational drugs and any forms of ED meds 24 hours before exam.   Misc Orders:  None    Further instructions given by staff:  I provide prep instructions to patient at the time of the appointment and reviewed date, time and location, patient verbalized that she understood and is aware to call if she has any questions. Patient was informed about the new cancellation policy for his/her procedure. Patient was also given a copy of the cancellation policy at the time of the appointment and verbalized understanding.

## 2024-02-20 ENCOUNTER — OFFICE VISIT (OUTPATIENT)
Dept: ENDOCRINOLOGY CLINIC | Facility: CLINIC | Age: 64
End: 2024-02-20

## 2024-02-20 VITALS
DIASTOLIC BLOOD PRESSURE: 66 MMHG | WEIGHT: 193.38 LBS | HEIGHT: 64 IN | HEART RATE: 59 BPM | BODY MASS INDEX: 33.02 KG/M2 | SYSTOLIC BLOOD PRESSURE: 112 MMHG

## 2024-02-20 DIAGNOSIS — R25.2 MUSCLE CRAMPS: ICD-10-CM

## 2024-02-20 DIAGNOSIS — Z13.9 SCREENING DUE: ICD-10-CM

## 2024-02-20 DIAGNOSIS — L65.9 HAIR LOSS: ICD-10-CM

## 2024-02-20 DIAGNOSIS — E66.09 CLASS 1 OBESITY DUE TO EXCESS CALORIES WITH SERIOUS COMORBIDITY AND BODY MASS INDEX (BMI) OF 33.0 TO 33.9 IN ADULT: Primary | ICD-10-CM

## 2024-02-20 PROBLEM — E66.811 CLASS 1 OBESITY DUE TO EXCESS CALORIES WITH SERIOUS COMORBIDITY AND BODY MASS INDEX (BMI) OF 33.0 TO 33.9 IN ADULT: Status: ACTIVE | Noted: 2024-02-20

## 2024-02-20 PROCEDURE — 99204 OFFICE O/P NEW MOD 45 MIN: CPT | Performed by: INTERNAL MEDICINE

## 2024-02-20 NOTE — H&P
New Patient Evaluation - History and Physical    CONSULT - Reason for Visit:  Hormonal Imbalance.  Requesting Physician: Dr. Mccoy (Derm)    CHIEF COMPLAINT:    Chief Complaint   Patient presents with    Consult     Hormone and low vitamin D, pt c/o hair loss and weight gain off and on 2 years now         HISTORY OF PRESENT ILLNESS:   Rika Rodriguez is a 63 year old female who presents with a concern for hormonal imbalance. She had been on thyroid medication for many years and then stopped this in 2017. She has had vitamin D deficiency and hair loss and dry skin.     She is taking vitamin D 5,000 international units daily along with calcium and magnesium daily. She has gained 10 pounds in less than a year. She has GI issues with constipation.     She has vaginal irritation.     Nutrition:  Does not eat red meat, eats poultry, eggs, fish; does not eat beans  Eats fruits and vegetables, oatmeal; [employer] gluten free flour- went gluten free and this helped with her symptoms.     PAST MEDICAL HISTORY:   Past Medical History:   Diagnosis Date    Autoimmune disease (HCC)     BACK PAIN     Blepharitis 2013    OU    Cataract 2013    OU    Cataract     CERVICAL DYSPLASIA     HPV    HEADACHES     migraines    Herpes infection     per NG: in one eye    Inguinal hernia 1969    Lazy eye 2013    per NG: \"patient says she has always had a lazy left eye that does not track\"    MGD (meibomian gland dysfunction) 2013    OU; Schirmer's test 13mm    OTHER DISEASES 7/03    ASCUS Pap    PLEVA (pityriasis lichenoides et varioliformis acuta)     PLEVA (pityriasis lichenoides et varioliformis acuta)     Thyroid disease 2007    medication    Viral conjunctivitis 09/04/2013    OD       PAST SURGICAL HISTORY:   Past Surgical History:   Procedure Laterality Date    BENIGN BIOPSY RIGHT  1994    BREAST BIOPSY  1994    CHOLECYSTECTOMY  2007    COLONOSCOPY  2006    repeat in 5 yrs    COLONOSCOPY  2012    COLONOSCOPY & POLYPECTOMY  2007    D & C       HERNIA SURGERY  1963    NEEDLE BIOPSY RIGHT          OTHER SURGICAL HISTORY      breast bx    OTHER SURGICAL HISTORY      inguinal hernia       SOCIAL HISTORY:    Social History     Socioeconomic History    Marital status:    Occupational History    Occupation: retired   Tobacco Use    Smoking status: Former     Packs/day: 1.00     Years: 10.00     Additional pack years: 0.00     Total pack years: 10.00     Types: Cigarettes     Quit date: 1988     Years since quittin.5    Smokeless tobacco: Never   Vaping Use    Vaping Use: Never used   Substance and Sexual Activity    Alcohol use: Yes     Comment: social    Drug use: No    Sexual activity: Yes   Other Topics Concern    Caffeine Concern Yes     Comment: coffee, 6 cups    Exercise Yes     Comment: walking   Social History Narrative    The patient does not use an assistive device..      The patient does live in a home with stairs.       FAMILY HISTORY:   Family History   Problem Relation Age of Onset    Mental Disorder Mother         depression    Thyroid disease Mother     Macular degeneration Mother     Cancer Maternal Grandmother         breast    Breast Cancer Maternal Grandmother         age -post menopause    Cataracts Sister     Cataracts Other         uncle    Diabetes Cousin     Glaucoma Neg        CURRENT MEDICATIONS:    Current Outpatient Medications   Medication Sig Dispense Refill    Calcium-Magnesium-Vitamin D (CALCIUM MAGNESIUM OR) Take by mouth.      clobetasol 0.05 % External Solution Apply 1 Bottle topically 2 (two) times daily.      ketoconazole 2 % External Shampoo Apply 240 mL topically twice a week.      Tavaborole 5 % External Solution Apply 1 Application topically As Directed.      clotrimazole 1 % External Cream Apply 1 Application topically 2 (two) times daily. (Patient not taking: Reported on 2023) 28 g 0    mupirocin 2 % External Ointment Apply 1 Application topically 3 (three) times daily. (Patient not  taking: Reported on 9/9/2023) 2 g 1    hydrocortisone 2.5 % External Cream Apply 1 Application topically nightly. (Patient not taking: Reported on 12/6/2023)      Magnesium 200 MG Oral Tab Take 1.75 tablets (350 mg total) by mouth daily.      OMEGA-3 FATTY ACIDS OR Take by mouth As Directed.      Ascorbic Acid (VITAMIN C) 100 MG Oral Tab Take 1 tablet (100 mg total) by mouth daily.      Multiple Vitamins-Minerals (HAIR/SKIN/NAILS) Oral Tab Take by mouth.         ALLERGIES:  Allergies   Allergen Reactions    Ibuprofen SWELLING     Other reaction(s): Swelling  Other reaction(s): IBUPROFEN  Other reaction(s): IBUPROFEN    Sulfa Antibiotics DIARRHEA     Other reaction(s): GI Intolerance  Other reaction(s): SULFA (SULFONAMIDE ANTIBIOTICS)  Lost wt, burning in stomach    Ra Ibuprofen SWELLING         ASSESSMENTS:     REVIEW OF SYSTEMS:  Constitutional: Negative for: weight change, fever, fatigue, cold/heat intolerance  Eyes: Negative for:  Visual changes, proptosis, blurring  ENT: Negative for:  dysphagia, neck swelling, dysphonia  Respiratory: Negative for:  dyspnea, cough  Cardiovascular: Negative for:  chest pain, palpitations, orthopnea  GI: Negative for:  abdominal pain, nausea, vomiting, diarrhea, constipation, bleeding  Neurology: Negative for: headache, numbness, weakness  Genito-Urinary: Negative for: dysuria, frequency  Psychiatric: Negative for:  depression, anxiety  Hematology/Lymphatics: Negative for: bruising, lower extremity edema  Endocrine: Negative for: polyuria, polydypsia  Skin: Negative for: rash, blister, cellulitis,      PHYSICAL EXAM:   Vitals:    02/20/24 1000   BP: 112/66   Pulse: 59   Weight: 193 lb 6.4 oz (87.7 kg)   Height: 5' 4\" (1.626 m)     BMI: Body mass index is 33.2 kg/m².     CONSTITUTIONAL:  awake, alert, cooperative, no apparent distress, and appears stated age  PSYCH: normal affect  SKIN:  no bruising or bleeding, no rashes and no lesions  EXTREMITIES: no edema      DATA:    Reviewed    ASSESSMENT AND PLAN: This is a 63 year-old woman here for evaluation and management of a myriad of symptoms such as hair loss, weight gain, and generalized feelings of being unwell. I would like to order metabolic and hormone studies as well as vitamin levels and try and optimize these. I will follow up with patient once test results are back.    I will start her on estrogen cream as well.     Prior to this encounter, I spent over 20 minutes with preparing for the visit, reviewing documents from other specialties as well as from PCP, and going over test results and imaging studies. During the face to face encounter, I spent an additional 30 minutes which were determined for history-taking, physical examination, and orientation regarding our services. Greater than 50% of the time was spent in counseling, anticipatory guidance, and coordination of care. Patient concerns were answered to the best of my knowledge.         2/20/2024  Pooja Dumont MD

## 2024-02-23 ENCOUNTER — LAB ENCOUNTER (OUTPATIENT)
Dept: LAB | Age: 64
End: 2024-02-23
Attending: INTERNAL MEDICINE
Payer: COMMERCIAL

## 2024-02-23 DIAGNOSIS — E66.09 CLASS 1 OBESITY DUE TO EXCESS CALORIES WITH SERIOUS COMORBIDITY AND BODY MASS INDEX (BMI) OF 33.0 TO 33.9 IN ADULT: ICD-10-CM

## 2024-02-23 DIAGNOSIS — Z13.9 SCREENING DUE: ICD-10-CM

## 2024-02-23 DIAGNOSIS — L65.9 HAIR LOSS: ICD-10-CM

## 2024-02-23 DIAGNOSIS — R25.2 MUSCLE CRAMPS: ICD-10-CM

## 2024-02-23 LAB
ALBUMIN SERPL-MCNC: 4.4 G/DL (ref 3.2–4.8)
ALBUMIN/GLOB SERPL: 1.5 {RATIO} (ref 1–2)
ALP LIVER SERPL-CCNC: 78 U/L
ALT SERPL-CCNC: 22 U/L
ANION GAP SERPL CALC-SCNC: 5 MMOL/L (ref 0–18)
AST SERPL-CCNC: 32 U/L (ref ?–34)
BASOPHILS # BLD AUTO: 0.08 X10(3) UL (ref 0–0.2)
BASOPHILS NFR BLD AUTO: 1.2 %
BILIRUB SERPL-MCNC: 1 MG/DL (ref 0.2–1.1)
BUN BLD-MCNC: 8 MG/DL (ref 9–23)
BUN/CREAT SERPL: 10.4 (ref 10–20)
CALCIUM BLD-MCNC: 9.6 MG/DL (ref 8.7–10.4)
CHLORIDE SERPL-SCNC: 107 MMOL/L (ref 98–112)
CHOLEST SERPL-MCNC: 177 MG/DL (ref ?–200)
CO2 SERPL-SCNC: 30 MMOL/L (ref 21–32)
CORTIS SERPL-MCNC: 9.7 UG/DL
CREAT BLD-MCNC: 0.77 MG/DL
DEPRECATED HBV CORE AB SER IA-ACNC: 142.8 NG/ML
DEPRECATED RDW RBC AUTO: 40.7 FL (ref 35.1–46.3)
DHEA-S SERPL-MCNC: 44.1 UG/DL
EGFRCR SERPLBLD CKD-EPI 2021: 86 ML/MIN/1.73M2 (ref 60–?)
EOSINOPHIL # BLD AUTO: 0.11 X10(3) UL (ref 0–0.7)
EOSINOPHIL NFR BLD AUTO: 1.7 %
ERYTHROCYTE [DISTWIDTH] IN BLOOD BY AUTOMATED COUNT: 12.1 % (ref 11–15)
EST. AVERAGE GLUCOSE BLD GHB EST-MCNC: 105 MG/DL (ref 68–126)
ESTRADIOL SERPL-MCNC: <11.8 PG/ML
FASTING PATIENT LIPID ANSWER: YES
FASTING STATUS PATIENT QL REPORTED: YES
FSH SERPL-ACNC: 128.9 MIU/ML
GLOBULIN PLAS-MCNC: 3 G/DL (ref 2.8–4.4)
GLUCOSE BLD-MCNC: 104 MG/DL (ref 70–99)
HBA1C MFR BLD: 5.3 % (ref ?–5.7)
HCT VFR BLD AUTO: 44.4 %
HDLC SERPL-MCNC: 62 MG/DL (ref 40–59)
HGB BLD-MCNC: 14.9 G/DL
IMM GRANULOCYTES # BLD AUTO: 0.01 X10(3) UL (ref 0–1)
IMM GRANULOCYTES NFR BLD: 0.2 %
INSULIN SERPL-ACNC: 5.2 MU/L (ref 3–25)
IRON SATN MFR SERPL: 63 %
IRON SERPL-MCNC: 149 UG/DL
LDLC SERPL CALC-MCNC: 101 MG/DL (ref ?–100)
LYMPHOCYTES # BLD AUTO: 2.03 X10(3) UL (ref 1–4)
LYMPHOCYTES NFR BLD AUTO: 31.4 %
MAGNESIUM SERPL-MCNC: 2.1 MG/DL (ref 1.6–2.6)
MCH RBC QN AUTO: 30.7 PG (ref 26–34)
MCHC RBC AUTO-ENTMCNC: 33.6 G/DL (ref 31–37)
MCV RBC AUTO: 91.4 FL
MONOCYTES # BLD AUTO: 0.52 X10(3) UL (ref 0.1–1)
MONOCYTES NFR BLD AUTO: 8 %
NEUTROPHILS # BLD AUTO: 3.71 X10 (3) UL (ref 1.5–7.7)
NEUTROPHILS # BLD AUTO: 3.71 X10(3) UL (ref 1.5–7.7)
NEUTROPHILS NFR BLD AUTO: 57.5 %
NONHDLC SERPL-MCNC: 115 MG/DL (ref ?–130)
OSMOLALITY SERPL CALC.SUM OF ELEC: 293 MOSM/KG (ref 275–295)
PLATELET # BLD AUTO: 242 10(3)UL (ref 150–450)
POTASSIUM SERPL-SCNC: 4.1 MMOL/L (ref 3.5–5.1)
PROLACTIN SERPL-MCNC: 4.8 NG/ML
PROT SERPL-MCNC: 7.4 G/DL (ref 5.7–8.2)
RBC # BLD AUTO: 4.86 X10(6)UL
SODIUM SERPL-SCNC: 142 MMOL/L (ref 136–145)
T3FREE SERPL-MCNC: 3.07 PG/ML (ref 2.4–4.2)
T4 FREE SERPL-MCNC: 1 NG/DL (ref 0.8–1.7)
TIBC SERPL-MCNC: 237 UG/DL (ref 250–425)
TRANSFERRIN SERPL-MCNC: 159 MG/DL (ref 250–380)
TRIGL SERPL-MCNC: 76 MG/DL (ref 30–149)
TSI SER-ACNC: 2.29 MIU/ML (ref 0.55–4.78)
VIT B12 SERPL-MCNC: 517 PG/ML (ref 211–911)
VIT D+METAB SERPL-MCNC: 44.3 NG/ML (ref 30–100)
VLDLC SERPL CALC-MCNC: 13 MG/DL (ref 0–30)
WBC # BLD AUTO: 6.5 X10(3) UL (ref 4–11)

## 2024-02-23 PROCEDURE — 83001 ASSAY OF GONADOTROPIN (FSH): CPT | Performed by: INTERNAL MEDICINE

## 2024-02-23 PROCEDURE — 83540 ASSAY OF IRON: CPT | Performed by: INTERNAL MEDICINE

## 2024-02-23 PROCEDURE — 84305 ASSAY OF SOMATOMEDIN: CPT | Performed by: INTERNAL MEDICINE

## 2024-02-23 PROCEDURE — 83036 HEMOGLOBIN GLYCOSYLATED A1C: CPT

## 2024-02-23 PROCEDURE — 83735 ASSAY OF MAGNESIUM: CPT | Performed by: INTERNAL MEDICINE

## 2024-02-23 PROCEDURE — 36415 COLL VENOUS BLD VENIPUNCTURE: CPT | Performed by: INTERNAL MEDICINE

## 2024-02-23 PROCEDURE — 85025 COMPLETE CBC W/AUTO DIFF WBC: CPT | Performed by: INTERNAL MEDICINE

## 2024-02-23 PROCEDURE — 82728 ASSAY OF FERRITIN: CPT | Performed by: INTERNAL MEDICINE

## 2024-02-23 PROCEDURE — 82670 ASSAY OF TOTAL ESTRADIOL: CPT

## 2024-02-23 PROCEDURE — 82533 TOTAL CORTISOL: CPT | Performed by: INTERNAL MEDICINE

## 2024-02-23 PROCEDURE — 84443 ASSAY THYROID STIM HORMONE: CPT | Performed by: INTERNAL MEDICINE

## 2024-02-23 PROCEDURE — 84146 ASSAY OF PROLACTIN: CPT | Performed by: INTERNAL MEDICINE

## 2024-02-23 PROCEDURE — 80053 COMPREHEN METABOLIC PANEL: CPT

## 2024-02-23 PROCEDURE — 82024 ASSAY OF ACTH: CPT | Performed by: INTERNAL MEDICINE

## 2024-02-23 PROCEDURE — 84410 TESTOSTERONE BIOAVAILABLE: CPT

## 2024-02-23 PROCEDURE — 84439 ASSAY OF FREE THYROXINE: CPT | Performed by: INTERNAL MEDICINE

## 2024-02-23 PROCEDURE — 82306 VITAMIN D 25 HYDROXY: CPT

## 2024-02-23 PROCEDURE — 84481 FREE ASSAY (FT-3): CPT | Performed by: INTERNAL MEDICINE

## 2024-02-23 PROCEDURE — 82627 DEHYDROEPIANDROSTERONE: CPT | Performed by: INTERNAL MEDICINE

## 2024-02-23 PROCEDURE — 80061 LIPID PANEL: CPT

## 2024-02-23 PROCEDURE — 84466 ASSAY OF TRANSFERRIN: CPT | Performed by: INTERNAL MEDICINE

## 2024-02-23 PROCEDURE — 83525 ASSAY OF INSULIN: CPT | Performed by: INTERNAL MEDICINE

## 2024-02-23 PROCEDURE — 82607 VITAMIN B-12: CPT | Performed by: INTERNAL MEDICINE

## 2024-02-25 LAB
IGF I: 125 NG/ML
IGF-1, Z SCORE: 0.3 S.D.

## 2024-02-26 LAB — ACTH: 5.9 PG/ML

## 2024-02-28 LAB
SEX HORM BIND GLOB: 51.1 NMOL/L
TESTOST % FREE+WEAK BND: 10.6 %
TESTOST FREE+WEAK BND: 3.4 NG/DL
TESTOSTERONE TOT /MS: 31.9 NG/DL

## 2024-03-02 ENCOUNTER — TELEPHONE (OUTPATIENT)
Dept: ENDOCRINOLOGY CLINIC | Facility: CLINIC | Age: 64
End: 2024-03-02

## 2024-03-02 DIAGNOSIS — N95.1 VAGINAL DRYNESS, MENOPAUSAL: Primary | ICD-10-CM

## 2024-03-03 RX ORDER — ESTRADIOL 0.1 MG/G
CREAM VAGINAL
Qty: 1 EACH | Refills: 1 | Status: SHIPPED | OUTPATIENT
Start: 2024-03-03

## 2024-03-03 NOTE — TELEPHONE ENCOUNTER
Hi!  Please let patient know that all of her blood tests are within normal limits. If she would like, I can have her see a dietitian and start her on a GLP-1 to help with weight loss. Thank you!

## 2024-03-04 NOTE — TELEPHONE ENCOUNTER
Spoke to patient and relayed message as shown below. Patient states she just started seeing a dietitian and an exercise . Would prefer to hold off on a GLP-1 for the time being.

## 2024-03-05 ENCOUNTER — TELEPHONE (OUTPATIENT)
Facility: CLINIC | Age: 64
End: 2024-03-05

## 2024-03-05 RX ORDER — POLYETHYLENE GLYCOL 3350, SODIUM CHLORIDE, SODIUM BICARBONATE, POTASSIUM CHLORIDE 420; 11.2; 5.72; 1.48 G/4L; G/4L; G/4L; G/4L
4000 POWDER, FOR SOLUTION ORAL AS DIRECTED
Qty: 4000 ML | Refills: 0 | Status: SHIPPED | OUTPATIENT
Start: 2024-03-05

## 2024-03-05 NOTE — TELEPHONE ENCOUNTER
RN called and spoke to pt at length about bowel prep instructions. RN answered all pt's questions. Discussed all pertinent instructions. 4 L bowel prep ordered at preferred pharmacy. Pt verbalized understanding.

## 2024-03-05 NOTE — TELEPHONE ENCOUNTER
Pt calling for a [facility]N prep to be sent to [facility].   She is also requesting to speak to RN regarding procedure instruction.  Pt states that if she can not be contacted today, she can be reached tomorrow after 9 am.   Please call

## 2024-03-07 ENCOUNTER — TELEPHONE (OUTPATIENT)
Facility: CLINIC | Age: 64
End: 2024-03-07

## 2024-03-07 NOTE — TELEPHONE ENCOUNTER
Patient wanted to know if she had to hold off on dairy the 5 days prior-I let her know that she has to hold off on raw fruits, vegetables, nuts, seeds, and popcorn at that time.  Dairy is ok but advised to be careful of fruited yogurt if has fruit with seeds.    All questions answered.

## 2024-03-08 NOTE — TELEPHONE ENCOUNTER
RN called and spoke to pt, RN answered her questions about diet. Pt verbalized understanding and had no further needs.

## 2024-03-08 NOTE — TELEPHONE ENCOUNTER
Patient calling asking if almond nut butter is okay or not prior to procedure on 3/12/24. Please call.

## 2024-03-14 ENCOUNTER — TELEPHONE (OUTPATIENT)
Facility: CLINIC | Age: 64
End: 2024-03-14

## 2024-03-14 NOTE — TELEPHONE ENCOUNTER
Pt requesting to speak with RN.  She states that she has questions regarding Post care from CLN.  Please call

## 2024-03-14 NOTE — TELEPHONE ENCOUNTER
RN called and spoke to pt. Pt states she is eating a regular diet now, had colonoscopy on 3/12/24. Pt has not had a bm yet and is worried. Pt denies abdominal pain, rigid belly, fever, bleeding. Pt did report episode of sensation to have bm but passed only a small amount of mucus.     Informed pt that it is normal to not have a bm for a few days after colonoscopy. Instructed pt to ctm and notify office for any new/worsening symptoms. Pt verbalized understanding.

## 2024-06-18 ENCOUNTER — TELEPHONE (OUTPATIENT)
Facility: CLINIC | Age: 64
End: 2024-06-18

## 2024-06-18 NOTE — TELEPHONE ENCOUNTER
----- Message from Freeman Garner sent at 6/17/2024  1:32 PM CDT -----  GI staff: please place recall in for colonoscopy in 10 years

## 2024-06-18 NOTE — TELEPHONE ENCOUNTER
Colon recall entered for 10 years per Dr. Garner. Last colonoscopy by Dr. Garner completed on 03/12/24. Health maintenance update. Pt outreach sent to be done in 10 years.

## 2025-08-18 ENCOUNTER — OFFICE VISIT (OUTPATIENT)
Dept: ENDOCRINOLOGY CLINIC | Facility: CLINIC | Age: 65
End: 2025-08-18

## 2025-08-18 VITALS — HEIGHT: 64 IN | WEIGHT: 193 LBS | BODY MASS INDEX: 32.95 KG/M2

## 2025-08-18 DIAGNOSIS — Z13.9 SCREENING DUE: Primary | ICD-10-CM

## 2025-08-18 DIAGNOSIS — E61.1 IRON DEFICIENCY: ICD-10-CM

## 2025-08-18 DIAGNOSIS — R79.89 LOW SERUM ADRENOCORTICOTROPHIC HORMONE (ACTH): ICD-10-CM

## 2025-08-18 DIAGNOSIS — L65.9 HAIR LOSS: ICD-10-CM

## 2025-08-18 PROCEDURE — 99214 OFFICE O/P EST MOD 30 MIN: CPT | Performed by: INTERNAL MEDICINE

## 2025-08-19 ENCOUNTER — LAB ENCOUNTER (OUTPATIENT)
Dept: LAB | Age: 65
End: 2025-08-19
Attending: INTERNAL MEDICINE

## 2025-08-19 DIAGNOSIS — R79.89 HIGH ALDOSTERONE LEVEL: Primary | ICD-10-CM

## 2025-08-19 LAB
ALBUMIN SERPL-MCNC: 4.5 G/DL (ref 3.2–4.8)
ALBUMIN/GLOB SERPL: 1.8 (ref 1–2)
ALP LIVER SERPL-CCNC: 91 U/L (ref 55–142)
ALT SERPL-CCNC: 23 U/L (ref 10–49)
ANION GAP SERPL CALC-SCNC: 6 MMOL/L (ref 0–18)
AST SERPL-CCNC: 25 U/L (ref ?–34)
BASOPHILS # BLD AUTO: 0.06 X10(3) UL (ref 0–0.2)
BASOPHILS NFR BLD AUTO: 0.9 %
BILIRUB SERPL-MCNC: 0.9 MG/DL (ref 0.2–1.1)
BUN BLD-MCNC: 11 MG/DL (ref 9–23)
BUN/CREAT SERPL: 13.8 (ref 10–20)
CALCIUM BLD-MCNC: 9.5 MG/DL (ref 8.7–10.4)
CHLORIDE SERPL-SCNC: 105 MMOL/L (ref 98–112)
CHOLEST SERPL-MCNC: 189 MG/DL (ref ?–200)
CO2 SERPL-SCNC: 31 MMOL/L (ref 21–32)
CORTIS SERPL-MCNC: 13.7 UG/DL
CREAT BLD-MCNC: 0.8 MG/DL (ref 0.55–1.02)
DEPRECATED HBV CORE AB SER IA-ACNC: 115 NG/ML (ref 50–306)
DEPRECATED RDW RBC AUTO: 41.5 FL (ref 35.1–46.3)
EGFRCR SERPLBLD CKD-EPI 2021: 81 ML/MIN/1.73M2 (ref 60–?)
EOSINOPHIL # BLD AUTO: 0.12 X10(3) UL (ref 0–0.7)
EOSINOPHIL NFR BLD AUTO: 1.7 %
ERYTHROCYTE [DISTWIDTH] IN BLOOD BY AUTOMATED COUNT: 12.5 % (ref 11–15)
EST. AVERAGE GLUCOSE BLD GHB EST-MCNC: 108 MG/DL (ref 68–126)
FASTING PATIENT LIPID ANSWER: YES
FASTING STATUS PATIENT QL REPORTED: YES
GLOBULIN PLAS-MCNC: 2.5 G/DL (ref 2–3.5)
GLUCOSE BLD-MCNC: 106 MG/DL (ref 70–99)
HBA1C MFR BLD: 5.4 % (ref ?–5.7)
HCT VFR BLD AUTO: 43.4 % (ref 35–48)
HDLC SERPL-MCNC: 59 MG/DL (ref 40–59)
HGB BLD-MCNC: 15 G/DL (ref 12–16)
IMM GRANULOCYTES # BLD AUTO: 0.01 X10(3) UL (ref 0–1)
IMM GRANULOCYTES NFR BLD: 0.1 %
IRON SATN MFR SERPL: 54 % (ref 15–50)
IRON SERPL-MCNC: 138 UG/DL (ref 50–170)
LDLC SERPL CALC-MCNC: 115 MG/DL (ref ?–100)
LYMPHOCYTES # BLD AUTO: 2.49 X10(3) UL (ref 1–4)
LYMPHOCYTES NFR BLD AUTO: 35.3 %
MCH RBC QN AUTO: 31.3 PG (ref 26–34)
MCHC RBC AUTO-ENTMCNC: 34.6 G/DL (ref 31–37)
MCV RBC AUTO: 90.4 FL (ref 80–100)
MONOCYTES # BLD AUTO: 0.57 X10(3) UL (ref 0.1–1)
MONOCYTES NFR BLD AUTO: 8.1 %
NEUTROPHILS # BLD AUTO: 3.8 X10 (3) UL (ref 1.5–7.7)
NEUTROPHILS # BLD AUTO: 3.8 X10(3) UL (ref 1.5–7.7)
NEUTROPHILS NFR BLD AUTO: 53.9 %
NONHDLC SERPL-MCNC: 130 MG/DL (ref ?–130)
OSMOLALITY SERPL CALC.SUM OF ELEC: 294 MOSM/KG (ref 275–295)
PLATELET # BLD AUTO: 224 10(3)UL (ref 150–450)
POTASSIUM SERPL-SCNC: 4.3 MMOL/L (ref 3.5–5.1)
PROT SERPL-MCNC: 7 G/DL (ref 5.7–8.2)
RBC # BLD AUTO: 4.8 X10(6)UL (ref 3.8–5.3)
SODIUM SERPL-SCNC: 142 MMOL/L (ref 136–145)
T4 FREE SERPL-MCNC: 1.1 NG/DL (ref 0.8–1.7)
TOTAL IRON BINDING CAPACITY: 256 UG/DL (ref 250–425)
TRANSFERRIN SERPL-MCNC: 178 MG/DL (ref 250–380)
TRIGL SERPL-MCNC: 81 MG/DL (ref 30–149)
TSI SER-ACNC: 2.41 UIU/ML (ref 0.55–4.78)
VIT B12 SERPL-MCNC: 427 PG/ML (ref 211–911)
VIT D+METAB SERPL-MCNC: 35.3 NG/ML (ref 30–100)
VLDLC SERPL CALC-MCNC: 14 MG/DL (ref 0–30)
WBC # BLD AUTO: 7.1 X10(3) UL (ref 4–11)

## 2025-08-19 PROCEDURE — 36415 COLL VENOUS BLD VENIPUNCTURE: CPT

## 2025-08-19 PROCEDURE — 82024 ASSAY OF ACTH: CPT

## 2025-08-19 PROCEDURE — 80061 LIPID PANEL: CPT | Performed by: INTERNAL MEDICINE

## 2025-08-19 PROCEDURE — 85025 COMPLETE CBC W/AUTO DIFF WBC: CPT | Performed by: INTERNAL MEDICINE

## 2025-08-19 PROCEDURE — 82533 TOTAL CORTISOL: CPT | Performed by: INTERNAL MEDICINE

## 2025-08-19 PROCEDURE — 82306 VITAMIN D 25 HYDROXY: CPT | Performed by: INTERNAL MEDICINE

## 2025-08-19 PROCEDURE — 82728 ASSAY OF FERRITIN: CPT | Performed by: INTERNAL MEDICINE

## 2025-08-19 PROCEDURE — 83036 HEMOGLOBIN GLYCOSYLATED A1C: CPT | Performed by: INTERNAL MEDICINE

## 2025-08-19 PROCEDURE — 84439 ASSAY OF FREE THYROXINE: CPT | Performed by: INTERNAL MEDICINE

## 2025-08-19 PROCEDURE — 83540 ASSAY OF IRON: CPT | Performed by: INTERNAL MEDICINE

## 2025-08-19 PROCEDURE — 80053 COMPREHEN METABOLIC PANEL: CPT | Performed by: INTERNAL MEDICINE

## 2025-08-19 PROCEDURE — 84443 ASSAY THYROID STIM HORMONE: CPT | Performed by: INTERNAL MEDICINE

## 2025-08-19 PROCEDURE — 82607 VITAMIN B-12: CPT | Performed by: INTERNAL MEDICINE

## 2025-08-19 PROCEDURE — 84466 ASSAY OF TRANSFERRIN: CPT | Performed by: INTERNAL MEDICINE

## 2025-08-20 LAB — ACTH: 10.3 PG/ML

## 2025-08-21 ENCOUNTER — OFFICE VISIT (OUTPATIENT)
Dept: INTERNAL MEDICINE CLINIC | Facility: CLINIC | Age: 65
End: 2025-08-21

## 2025-08-21 VITALS
HEART RATE: 64 BPM | BODY MASS INDEX: 33.66 KG/M2 | DIASTOLIC BLOOD PRESSURE: 64 MMHG | WEIGHT: 197.19 LBS | HEIGHT: 64 IN | TEMPERATURE: 98 F | SYSTOLIC BLOOD PRESSURE: 106 MMHG | OXYGEN SATURATION: 97 %

## 2025-08-21 DIAGNOSIS — Z12.11 COLON CANCER SCREENING: ICD-10-CM

## 2025-08-21 DIAGNOSIS — E66.09 CLASS 1 OBESITY DUE TO EXCESS CALORIES WITH SERIOUS COMORBIDITY AND BODY MASS INDEX (BMI) OF 33.0 TO 33.9 IN ADULT: ICD-10-CM

## 2025-08-21 DIAGNOSIS — Z00.00 ANNUAL PHYSICAL EXAM: Primary | ICD-10-CM

## 2025-08-21 DIAGNOSIS — E66.811 CLASS 1 OBESITY DUE TO EXCESS CALORIES WITH SERIOUS COMORBIDITY AND BODY MASS INDEX (BMI) OF 33.0 TO 33.9 IN ADULT: ICD-10-CM

## 2025-08-21 DIAGNOSIS — Z78.0 POSTMENOPAUSAL: ICD-10-CM

## 2025-08-21 DIAGNOSIS — R79.89 SERUM TRANSFERRIN IN UNDETECTABLE TO LOW RANGE: ICD-10-CM

## 2025-08-21 DIAGNOSIS — R53.83 FATIGUE, UNSPECIFIED TYPE: ICD-10-CM

## 2025-08-21 DIAGNOSIS — Z12.31 ENCOUNTER FOR SCREENING MAMMOGRAM FOR MALIGNANT NEOPLASM OF BREAST: ICD-10-CM

## 2025-08-21 DIAGNOSIS — Z13.6 SCREENING FOR HEART DISEASE: ICD-10-CM

## 2025-08-21 DIAGNOSIS — Z71.85 VACCINE COUNSELING: ICD-10-CM

## 2025-08-21 PROCEDURE — 99397 PER PM REEVAL EST PAT 65+ YR: CPT | Performed by: NURSE PRACTITIONER

## 2025-08-21 PROCEDURE — 90715 TDAP VACCINE 7 YRS/> IM: CPT | Performed by: INTERNAL MEDICINE

## 2025-08-21 PROCEDURE — 90471 IMMUNIZATION ADMIN: CPT | Performed by: INTERNAL MEDICINE

## 2025-08-28 ENCOUNTER — OFFICE VISIT (OUTPATIENT)
Facility: LOCATION | Age: 65
End: 2025-08-28
Attending: INTERNAL MEDICINE

## 2025-08-28 VITALS
WEIGHT: 193 LBS | SYSTOLIC BLOOD PRESSURE: 131 MMHG | HEIGHT: 64 IN | BODY MASS INDEX: 32.95 KG/M2 | OXYGEN SATURATION: 99 % | TEMPERATURE: 98 F | RESPIRATION RATE: 18 BRPM | DIASTOLIC BLOOD PRESSURE: 77 MMHG | HEART RATE: 60 BPM

## 2025-08-28 DIAGNOSIS — R89.9 ABNORMAL LABORATORY TEST: Primary | ICD-10-CM

## 2025-08-28 DIAGNOSIS — R79.0 ABNORMAL IRON SATURATION: ICD-10-CM

## 2025-08-29 ENCOUNTER — HOSPITAL ENCOUNTER (OUTPATIENT)
Dept: CT IMAGING | Facility: HOSPITAL | Age: 65
Discharge: HOME | End: 2025-08-29
Attending: NURSE PRACTITIONER

## 2025-08-29 ENCOUNTER — EKG ENCOUNTER (OUTPATIENT)
Dept: LAB | Facility: HOSPITAL | Age: 65
End: 2025-08-29
Attending: NURSE PRACTITIONER

## 2025-08-29 DIAGNOSIS — R53.83 FATIGUE, UNSPECIFIED TYPE: ICD-10-CM

## 2025-08-29 DIAGNOSIS — Z13.6 SCREENING FOR HEART DISEASE: ICD-10-CM

## 2025-08-29 LAB
ATRIAL RATE: 60 BPM
P AXIS: 64 DEGREES
P-R INTERVAL: 154 MS
Q-T INTERVAL: 418 MS
QRS DURATION: 82 MS
QTC CALCULATION (BEZET): 418 MS
R AXIS: 10 DEGREES
T AXIS: 16 DEGREES
VENTRICULAR RATE: 60 BPM

## 2025-08-29 PROCEDURE — 93005 ELECTROCARDIOGRAM TRACING: CPT

## 2025-08-29 PROCEDURE — 93010 ELECTROCARDIOGRAM REPORT: CPT | Performed by: INTERNAL MEDICINE

## (undated) NOTE — ED AVS SNAPSHOT
Divina Moreno   MRN: [id number]    Department:  [facility] Emergency Department   Date of Visit:  3/19/2019           Disclosure     Insurance plans vary and the physician(s) referred by the ER may not be covered by your plan.  Please contact yo CARE PHYSICIAN AT ONCE OR RETURN IMMEDIATELY TO THE EMERGENCY DEPARTMENT. If you have been prescribed any medication(s), please fill your prescription right away and begin taking the medication(s) as directed.   If you believe that any of the medications

## (undated) NOTE — ED AVS SNAPSHOT
[facility] Emergency Department  [address]   [place] [address]  Phone:  [phone]  Fax:  Jessica Houston 19   MRN: [id number]    Department:  [facility] Emergency Department   Date of Visit:  6/30/2017 visiting [email or web address].    IF THERE IS ANY CHANGE OR WORSENING OF YOUR CONDITION, CALL YOUR PRIMARY CARE PHYSICIAN AT ONCE OR RETURN IMMEDIATELY TO THE EMERGENCY DEPARTMENT.     If you have been prescribed any medication(s), please fill your prescription

## (undated) NOTE — MR AVS SNAPSHOT
[facility]  [address] [facility] [id number] [phone]               Thank you for choosing us for your health care visit with Batsheva Maddox MD.  We are glad to serve you and happy to provide you with this summary of your · Urinary tract infections  · In women, menstrual cramps, fibroids, or endometriosis  · Inflammation or infection of the intestines  Diagnosing the cause of abdominal pain  Your healthcare provider will do a physical exam help find the cause of your pain. · Continued lack of appetite  · Blood in your stool  How to prevent abdominal pain  Here are some tips to help prevent abdominal pain:  · Eat smaller amounts of food at one time. · Avoid greasy, fried, or other high-fat foods.   · Avoid foods that give you Where to Get Your Medications      These medications were sent to [place] [address] [facility], [place] - [address], [phone], [phone] [address] [place], [address] [facility] [address]